# Patient Record
Sex: MALE | Race: WHITE | Employment: FULL TIME | URBAN - METROPOLITAN AREA
[De-identification: names, ages, dates, MRNs, and addresses within clinical notes are randomized per-mention and may not be internally consistent; named-entity substitution may affect disease eponyms.]

---

## 2024-10-03 ENCOUNTER — APPOINTMENT (OUTPATIENT)
Dept: GENERAL RADIOLOGY | Age: 44
DRG: 637 | End: 2024-10-03
Payer: COMMERCIAL

## 2024-10-03 ENCOUNTER — HOSPITAL ENCOUNTER (INPATIENT)
Age: 44
LOS: 5 days | Discharge: HOME | DRG: 637 | End: 2024-10-09
Attending: STUDENT IN AN ORGANIZED HEALTH CARE EDUCATION/TRAINING PROGRAM | Admitting: FAMILY MEDICINE
Payer: COMMERCIAL

## 2024-10-03 DIAGNOSIS — E08.10 DIABETIC KETOACIDOSIS WITHOUT COMA ASSOCIATED WITH DIABETES MELLITUS DUE TO UNDERLYING CONDITION (HCC): Primary | ICD-10-CM

## 2024-10-03 LAB
ALBUMIN SERPL-MCNC: 4.2 G/DL (ref 3.4–5)
ALBUMIN/GLOB SERPL: 1.4 {RATIO} (ref 1.1–2.2)
ALP SERPL-CCNC: 138 U/L (ref 40–129)
ALT SERPL-CCNC: 13 U/L (ref 10–40)
ANION GAP SERPL CALCULATED.3IONS-SCNC: 23 MMOL/L (ref 3–16)
AST SERPL-CCNC: 20 U/L (ref 15–37)
BASE EXCESS BLDV CALC-SCNC: -5.6 MMOL/L (ref -3–3)
BASOPHILS # BLD: 0 K/UL (ref 0–0.2)
BASOPHILS NFR BLD: 0.3 %
BETA-HYDROXYBUTYRATE: 4.2 MMOL/L (ref 0–0.27)
BILIRUB SERPL-MCNC: 1.2 MG/DL (ref 0–1)
BUN SERPL-MCNC: 34 MG/DL (ref 7–20)
CALCIUM SERPL-MCNC: 9.4 MG/DL (ref 8.3–10.6)
CHLORIDE SERPL-SCNC: 90 MMOL/L (ref 99–110)
CO2 BLDV-SCNC: 21 MMOL/L
CO2 SERPL-SCNC: 19 MMOL/L (ref 21–32)
COHGB MFR BLDV: 1.8 % (ref 0–1.5)
CREAT SERPL-MCNC: 2.2 MG/DL (ref 0.9–1.3)
DEPRECATED RDW RBC AUTO: 12.7 % (ref 12.4–15.4)
EOSINOPHIL # BLD: 0 K/UL (ref 0–0.6)
EOSINOPHIL NFR BLD: 0.2 %
ETHANOLAMINE SERPL-MCNC: NORMAL MG/DL (ref 0–0.08)
GFR SERPLBLD CREATININE-BSD FMLA CKD-EPI: 36 ML/MIN/{1.73_M2}
GLUCOSE BLD-MCNC: >600 MG/DL (ref 70–99)
GLUCOSE SERPL-MCNC: 916 MG/DL (ref 70–99)
HCO3 BLDV-SCNC: 19.5 MMOL/L (ref 23–29)
HCT VFR BLD AUTO: 43.6 % (ref 40.5–52.5)
HGB BLD-MCNC: 14.5 G/DL (ref 13.5–17.5)
INR PPP: 0.94 (ref 0.85–1.15)
LACTATE BLDV-SCNC: 3.3 MMOL/L (ref 0.4–2)
LIPASE SERPL-CCNC: 17 U/L (ref 13–60)
LYMPHOCYTES # BLD: 0.8 K/UL (ref 1–5.1)
LYMPHOCYTES NFR BLD: 4.9 %
MCH RBC QN AUTO: 30.9 PG (ref 26–34)
MCHC RBC AUTO-ENTMCNC: 33.3 G/DL (ref 31–36)
MCV RBC AUTO: 92.7 FL (ref 80–100)
METHGB MFR BLDV: 0 %
MONOCYTES # BLD: 1.2 K/UL (ref 0–1.3)
MONOCYTES NFR BLD: 7.6 %
NEUTROPHILS # BLD: 13.9 K/UL (ref 1.7–7.7)
NEUTROPHILS NFR BLD: 87 %
O2 THERAPY: ABNORMAL
PCO2 BLDV: 37.1 MMHG (ref 40–50)
PERFORMED ON: ABNORMAL
PH BLDV: 7.34 [PH] (ref 7.35–7.45)
PLATELET # BLD AUTO: 253 K/UL (ref 135–450)
PMV BLD AUTO: 10.2 FL (ref 5–10.5)
PO2 BLDV: 88 MMHG (ref 25–40)
POTASSIUM SERPL-SCNC: 4.6 MMOL/L (ref 3.5–5.1)
PROT SERPL-MCNC: 7.3 G/DL (ref 6.4–8.2)
PROTHROMBIN TIME: 12.8 SEC (ref 11.9–14.9)
RBC # BLD AUTO: 4.7 M/UL (ref 4.2–5.9)
SAO2 % BLDV: 96 %
SODIUM SERPL-SCNC: 132 MMOL/L (ref 136–145)
TROPONIN, HIGH SENSITIVITY: 24 NG/L (ref 0–22)
WBC # BLD AUTO: 16 K/UL (ref 4–11)

## 2024-10-03 PROCEDURE — 6360000002 HC RX W HCPCS

## 2024-10-03 PROCEDURE — 96375 TX/PRO/DX INJ NEW DRUG ADDON: CPT

## 2024-10-03 PROCEDURE — 96374 THER/PROPH/DIAG INJ IV PUSH: CPT

## 2024-10-03 PROCEDURE — 84484 ASSAY OF TROPONIN QUANT: CPT

## 2024-10-03 PROCEDURE — 82077 ASSAY SPEC XCP UR&BREATH IA: CPT

## 2024-10-03 PROCEDURE — 82010 KETONE BODYS QUAN: CPT

## 2024-10-03 PROCEDURE — 6360000002 HC RX W HCPCS: Performed by: STUDENT IN AN ORGANIZED HEALTH CARE EDUCATION/TRAINING PROGRAM

## 2024-10-03 PROCEDURE — 2580000003 HC RX 258: Performed by: STUDENT IN AN ORGANIZED HEALTH CARE EDUCATION/TRAINING PROGRAM

## 2024-10-03 PROCEDURE — 85025 COMPLETE CBC W/AUTO DIFF WBC: CPT

## 2024-10-03 PROCEDURE — 83690 ASSAY OF LIPASE: CPT

## 2024-10-03 PROCEDURE — 83605 ASSAY OF LACTIC ACID: CPT

## 2024-10-03 PROCEDURE — 82803 BLOOD GASES ANY COMBINATION: CPT

## 2024-10-03 PROCEDURE — 71045 X-RAY EXAM CHEST 1 VIEW: CPT

## 2024-10-03 PROCEDURE — 85610 PROTHROMBIN TIME: CPT

## 2024-10-03 PROCEDURE — 80053 COMPREHEN METABOLIC PANEL: CPT

## 2024-10-03 PROCEDURE — 83930 ASSAY OF BLOOD OSMOLALITY: CPT

## 2024-10-03 PROCEDURE — 83036 HEMOGLOBIN GLYCOSYLATED A1C: CPT

## 2024-10-03 PROCEDURE — 99285 EMERGENCY DEPT VISIT HI MDM: CPT

## 2024-10-03 PROCEDURE — 93005 ELECTROCARDIOGRAM TRACING: CPT | Performed by: STUDENT IN AN ORGANIZED HEALTH CARE EDUCATION/TRAINING PROGRAM

## 2024-10-03 RX ORDER — SODIUM CHLORIDE 450 MG/100ML
INJECTION, SOLUTION INTRAVENOUS CONTINUOUS
Status: DISCONTINUED | OUTPATIENT
Start: 2024-10-03 | End: 2024-10-04

## 2024-10-03 RX ORDER — PROCHLORPERAZINE EDISYLATE 5 MG/ML
10 INJECTION INTRAMUSCULAR; INTRAVENOUS ONCE
Status: COMPLETED | OUTPATIENT
Start: 2024-10-03 | End: 2024-10-03

## 2024-10-03 RX ORDER — POTASSIUM CHLORIDE 7.45 MG/ML
10 INJECTION INTRAVENOUS PRN
Status: DISCONTINUED | OUTPATIENT
Start: 2024-10-03 | End: 2024-10-04 | Stop reason: SDUPTHER

## 2024-10-03 RX ORDER — DROPERIDOL 2.5 MG/ML
0.62 INJECTION, SOLUTION INTRAMUSCULAR; INTRAVENOUS ONCE
Status: COMPLETED | OUTPATIENT
Start: 2024-10-03 | End: 2024-10-03

## 2024-10-03 RX ORDER — DIPHENHYDRAMINE HYDROCHLORIDE 50 MG/ML
10 INJECTION INTRAMUSCULAR; INTRAVENOUS ONCE
Status: COMPLETED | OUTPATIENT
Start: 2024-10-03 | End: 2024-10-03

## 2024-10-03 RX ORDER — ONDANSETRON 2 MG/ML
INJECTION INTRAMUSCULAR; INTRAVENOUS
Status: COMPLETED
Start: 2024-10-03 | End: 2024-10-03

## 2024-10-03 RX ORDER — DEXTROSE MONOHYDRATE, SODIUM CHLORIDE, AND POTASSIUM CHLORIDE 50; 1.49; 4.5 G/1000ML; G/1000ML; G/1000ML
INJECTION, SOLUTION INTRAVENOUS CONTINUOUS PRN
Status: DISCONTINUED | OUTPATIENT
Start: 2024-10-03 | End: 2024-10-04 | Stop reason: SDUPTHER

## 2024-10-03 RX ORDER — SODIUM CHLORIDE, SODIUM LACTATE, POTASSIUM CHLORIDE, AND CALCIUM CHLORIDE .6; .31; .03; .02 G/100ML; G/100ML; G/100ML; G/100ML
1000 INJECTION, SOLUTION INTRAVENOUS ONCE
Status: COMPLETED | OUTPATIENT
Start: 2024-10-03 | End: 2024-10-03

## 2024-10-03 RX ORDER — MAGNESIUM SULFATE IN WATER 40 MG/ML
2000 INJECTION, SOLUTION INTRAVENOUS PRN
Status: DISCONTINUED | OUTPATIENT
Start: 2024-10-03 | End: 2024-10-04 | Stop reason: SDUPTHER

## 2024-10-03 RX ADMIN — ONDANSETRON 4 MG: 2 INJECTION INTRAMUSCULAR; INTRAVENOUS at 22:19

## 2024-10-03 RX ADMIN — DIPHENHYDRAMINE HYDROCHLORIDE 10 MG: 50 INJECTION INTRAMUSCULAR; INTRAVENOUS at 22:35

## 2024-10-03 RX ADMIN — DROPERIDOL 0.62 MG: 2.5 INJECTION, SOLUTION INTRAMUSCULAR; INTRAVENOUS at 23:41

## 2024-10-03 RX ADMIN — SODIUM CHLORIDE, POTASSIUM CHLORIDE, SODIUM LACTATE AND CALCIUM CHLORIDE 1000 ML: 600; 310; 30; 20 INJECTION, SOLUTION INTRAVENOUS at 22:20

## 2024-10-03 RX ADMIN — PROCHLORPERAZINE EDISYLATE 10 MG: 5 INJECTION INTRAMUSCULAR; INTRAVENOUS at 22:35

## 2024-10-03 ASSESSMENT — LIFESTYLE VARIABLES
HOW MANY STANDARD DRINKS CONTAINING ALCOHOL DO YOU HAVE ON A TYPICAL DAY: 1 OR 2
HOW OFTEN DO YOU HAVE A DRINK CONTAINING ALCOHOL: 2-4 TIMES A MONTH

## 2024-10-03 ASSESSMENT — PAIN - FUNCTIONAL ASSESSMENT: PAIN_FUNCTIONAL_ASSESSMENT: NONE - DENIES PAIN

## 2024-10-04 ENCOUNTER — APPOINTMENT (OUTPATIENT)
Dept: GENERAL RADIOLOGY | Age: 44
DRG: 637 | End: 2024-10-04
Payer: COMMERCIAL

## 2024-10-04 PROBLEM — E10.10 DKA, TYPE 1, NOT AT GOAL (HCC): Status: ACTIVE | Noted: 2024-10-04

## 2024-10-04 PROBLEM — D72.823 LEUKEMOID REACTION: Status: ACTIVE | Noted: 2024-10-04

## 2024-10-04 PROBLEM — N18.32 STAGE 3B CHRONIC KIDNEY DISEASE (HCC): Status: ACTIVE | Noted: 2024-10-04

## 2024-10-04 PROBLEM — E08.10 DIABETIC KETOACIDOSIS WITHOUT COMA ASSOCIATED WITH DIABETES MELLITUS DUE TO UNDERLYING CONDITION (HCC): Status: ACTIVE | Noted: 2024-10-04

## 2024-10-04 LAB
ANION GAP SERPL CALCULATED.3IONS-SCNC: 11 MMOL/L (ref 3–16)
ANION GAP SERPL CALCULATED.3IONS-SCNC: 18 MMOL/L (ref 3–16)
ANION GAP SERPL CALCULATED.3IONS-SCNC: 22 MMOL/L (ref 3–16)
ANION GAP SERPL CALCULATED.3IONS-SCNC: 23 MMOL/L (ref 3–16)
ANION GAP SERPL CALCULATED.3IONS-SCNC: 9 MMOL/L (ref 3–16)
BUN SERPL-MCNC: 36 MG/DL (ref 7–20)
BUN SERPL-MCNC: 41 MG/DL (ref 7–20)
BUN SERPL-MCNC: 43 MG/DL (ref 7–20)
CALCIUM SERPL-MCNC: 8.4 MG/DL (ref 8.3–10.6)
CALCIUM SERPL-MCNC: 8.4 MG/DL (ref 8.3–10.6)
CALCIUM SERPL-MCNC: 8.7 MG/DL (ref 8.3–10.6)
CALCIUM SERPL-MCNC: 9.1 MG/DL (ref 8.3–10.6)
CALCIUM SERPL-MCNC: 9.2 MG/DL (ref 8.3–10.6)
CHLORIDE SERPL-SCNC: 106 MMOL/L (ref 99–110)
CHLORIDE SERPL-SCNC: 108 MMOL/L (ref 99–110)
CHLORIDE SERPL-SCNC: 90 MMOL/L (ref 99–110)
CHLORIDE SERPL-SCNC: 92 MMOL/L (ref 99–110)
CHLORIDE SERPL-SCNC: 99 MMOL/L (ref 99–110)
CO2 SERPL-SCNC: 19 MMOL/L (ref 21–32)
CO2 SERPL-SCNC: 20 MMOL/L (ref 21–32)
CO2 SERPL-SCNC: 20 MMOL/L (ref 21–32)
CO2 SERPL-SCNC: 23 MMOL/L (ref 21–32)
CO2 SERPL-SCNC: 25 MMOL/L (ref 21–32)
CREAT SERPL-MCNC: 2.2 MG/DL (ref 0.9–1.3)
CREAT SERPL-MCNC: 2.3 MG/DL (ref 0.9–1.3)
CREAT SERPL-MCNC: 2.7 MG/DL (ref 0.9–1.3)
CREAT SERPL-MCNC: 2.8 MG/DL (ref 0.9–1.3)
CREAT SERPL-MCNC: 2.8 MG/DL (ref 0.9–1.3)
EKG ATRIAL RATE: 103 BPM
EKG DIAGNOSIS: NORMAL
EKG P AXIS: 62 DEGREES
EKG P-R INTERVAL: 136 MS
EKG Q-T INTERVAL: 364 MS
EKG QRS DURATION: 106 MS
EKG QTC CALCULATION (BAZETT): 476 MS
EKG R AXIS: 79 DEGREES
EKG T AXIS: 35 DEGREES
EKG VENTRICULAR RATE: 103 BPM
EST. AVERAGE GLUCOSE BLD GHB EST-MCNC: 174.3 MG/DL
GFR SERPLBLD CREATININE-BSD FMLA CKD-EPI: 27 ML/MIN/{1.73_M2}
GFR SERPLBLD CREATININE-BSD FMLA CKD-EPI: 27 ML/MIN/{1.73_M2}
GFR SERPLBLD CREATININE-BSD FMLA CKD-EPI: 28 ML/MIN/{1.73_M2}
GFR SERPLBLD CREATININE-BSD FMLA CKD-EPI: 35 ML/MIN/{1.73_M2}
GFR SERPLBLD CREATININE-BSD FMLA CKD-EPI: 36 ML/MIN/{1.73_M2}
GLUCOSE BLD-MCNC: 105 MG/DL (ref 70–99)
GLUCOSE BLD-MCNC: 134 MG/DL (ref 70–99)
GLUCOSE BLD-MCNC: 146 MG/DL (ref 70–99)
GLUCOSE BLD-MCNC: 159 MG/DL (ref 70–99)
GLUCOSE BLD-MCNC: 193 MG/DL (ref 70–99)
GLUCOSE BLD-MCNC: 221 MG/DL (ref 70–99)
GLUCOSE BLD-MCNC: 265 MG/DL (ref 70–99)
GLUCOSE BLD-MCNC: 295 MG/DL (ref 70–99)
GLUCOSE BLD-MCNC: 358 MG/DL (ref 70–99)
GLUCOSE BLD-MCNC: 381 MG/DL (ref 70–99)
GLUCOSE BLD-MCNC: 470 MG/DL (ref 70–99)
GLUCOSE BLD-MCNC: 523 MG/DL (ref 70–99)
GLUCOSE BLD-MCNC: 587 MG/DL (ref 70–99)
GLUCOSE BLD-MCNC: 85 MG/DL (ref 70–99)
GLUCOSE BLD-MCNC: >600 MG/DL (ref 70–99)
GLUCOSE BLD-MCNC: >600 MG/DL (ref 70–99)
GLUCOSE SERPL-MCNC: 214 MG/DL (ref 70–99)
GLUCOSE SERPL-MCNC: 390 MG/DL (ref 70–99)
GLUCOSE SERPL-MCNC: 605 MG/DL (ref 70–99)
GLUCOSE SERPL-MCNC: 886 MG/DL (ref 70–99)
GLUCOSE SERPL-MCNC: 906 MG/DL (ref 70–99)
HBA1C MFR BLD: 7.7 %
LACTATE BLDV-SCNC: 3.4 MMOL/L (ref 0.4–2)
MAGNESIUM SERPL-MCNC: 1.9 MG/DL (ref 1.8–2.4)
MAGNESIUM SERPL-MCNC: 2 MG/DL (ref 1.8–2.4)
MAGNESIUM SERPL-MCNC: 2.1 MG/DL (ref 1.8–2.4)
OSMOLALITY SERPL: 364 MOSM/KG (ref 275–295)
PERFORMED ON: ABNORMAL
PERFORMED ON: NORMAL
PHOSPHATE SERPL-MCNC: 0.7 MG/DL (ref 2.5–4.9)
PHOSPHATE SERPL-MCNC: 1.2 MG/DL (ref 2.5–4.9)
PHOSPHATE SERPL-MCNC: 1.4 MG/DL (ref 2.5–4.9)
POTASSIUM SERPL-SCNC: 3.1 MMOL/L (ref 3.5–5.1)
POTASSIUM SERPL-SCNC: 3.3 MMOL/L (ref 3.5–5.1)
POTASSIUM SERPL-SCNC: 3.4 MMOL/L (ref 3.5–5.1)
POTASSIUM SERPL-SCNC: 3.8 MMOL/L (ref 3.5–5.1)
POTASSIUM SERPL-SCNC: 4.7 MMOL/L (ref 3.5–5.1)
SODIUM SERPL-SCNC: 133 MMOL/L (ref 136–145)
SODIUM SERPL-SCNC: 133 MMOL/L (ref 136–145)
SODIUM SERPL-SCNC: 137 MMOL/L (ref 136–145)
SODIUM SERPL-SCNC: 140 MMOL/L (ref 136–145)
SODIUM SERPL-SCNC: 142 MMOL/L (ref 136–145)
TROPONIN, HIGH SENSITIVITY: 29 NG/L (ref 0–22)

## 2024-10-04 PROCEDURE — 6370000000 HC RX 637 (ALT 250 FOR IP): Performed by: INTERNAL MEDICINE

## 2024-10-04 PROCEDURE — 2500000003 HC RX 250 WO HCPCS: Performed by: FAMILY MEDICINE

## 2024-10-04 PROCEDURE — 2580000003 HC RX 258: Performed by: INTERNAL MEDICINE

## 2024-10-04 PROCEDURE — 74018 RADEX ABDOMEN 1 VIEW: CPT

## 2024-10-04 PROCEDURE — 2580000003 HC RX 258: Performed by: FAMILY MEDICINE

## 2024-10-04 PROCEDURE — 6360000002 HC RX W HCPCS: Performed by: FAMILY MEDICINE

## 2024-10-04 PROCEDURE — 83036 HEMOGLOBIN GLYCOSYLATED A1C: CPT

## 2024-10-04 PROCEDURE — 6370000000 HC RX 637 (ALT 250 FOR IP): Performed by: FAMILY MEDICINE

## 2024-10-04 PROCEDURE — 99223 1ST HOSP IP/OBS HIGH 75: CPT | Performed by: INTERNAL MEDICINE

## 2024-10-04 PROCEDURE — 94761 N-INVAS EAR/PLS OXIMETRY MLT: CPT

## 2024-10-04 PROCEDURE — 6370000000 HC RX 637 (ALT 250 FOR IP): Performed by: STUDENT IN AN ORGANIZED HEALTH CARE EDUCATION/TRAINING PROGRAM

## 2024-10-04 PROCEDURE — 2000000000 HC ICU R&B

## 2024-10-04 PROCEDURE — 83605 ASSAY OF LACTIC ACID: CPT

## 2024-10-04 PROCEDURE — 2700000000 HC OXYGEN THERAPY PER DAY

## 2024-10-04 PROCEDURE — 93010 ELECTROCARDIOGRAM REPORT: CPT | Performed by: INTERNAL MEDICINE

## 2024-10-04 PROCEDURE — 99232 SBSQ HOSP IP/OBS MODERATE 35: CPT | Performed by: INTERNAL MEDICINE

## 2024-10-04 PROCEDURE — 84100 ASSAY OF PHOSPHORUS: CPT

## 2024-10-04 PROCEDURE — 80048 BASIC METABOLIC PNL TOTAL CA: CPT

## 2024-10-04 PROCEDURE — 36415 COLL VENOUS BLD VENIPUNCTURE: CPT

## 2024-10-04 PROCEDURE — 83735 ASSAY OF MAGNESIUM: CPT

## 2024-10-04 PROCEDURE — 6360000002 HC RX W HCPCS: Performed by: INTERNAL MEDICINE

## 2024-10-04 PROCEDURE — 2580000003 HC RX 258: Performed by: STUDENT IN AN ORGANIZED HEALTH CARE EDUCATION/TRAINING PROGRAM

## 2024-10-04 RX ORDER — INSULIN GLARGINE 100 [IU]/ML
15 INJECTION, SOLUTION SUBCUTANEOUS ONCE
Status: DISCONTINUED | OUTPATIENT
Start: 2024-10-04 | End: 2024-10-04

## 2024-10-04 RX ORDER — ONDANSETRON 4 MG/1
4 TABLET, ORALLY DISINTEGRATING ORAL EVERY 8 HOURS PRN
Status: DISCONTINUED | OUTPATIENT
Start: 2024-10-04 | End: 2024-10-09 | Stop reason: HOSPADM

## 2024-10-04 RX ORDER — SODIUM CHLORIDE, SODIUM LACTATE, POTASSIUM CHLORIDE, AND CALCIUM CHLORIDE .6; .31; .03; .02 G/100ML; G/100ML; G/100ML; G/100ML
1000 INJECTION, SOLUTION INTRAVENOUS ONCE
Status: COMPLETED | OUTPATIENT
Start: 2024-10-04 | End: 2024-10-04

## 2024-10-04 RX ORDER — ROSUVASTATIN CALCIUM 20 MG/1
20 TABLET, COATED ORAL DAILY
COMMUNITY

## 2024-10-04 RX ORDER — ERGOCALCIFEROL 1.25 MG/1
50000 CAPSULE, LIQUID FILLED ORAL WEEKLY
COMMUNITY

## 2024-10-04 RX ORDER — 0.9 % SODIUM CHLORIDE 0.9 %
1000 INTRAVENOUS SOLUTION INTRAVENOUS ONCE
Status: COMPLETED | OUTPATIENT
Start: 2024-10-04 | End: 2024-10-04

## 2024-10-04 RX ORDER — ENOXAPARIN SODIUM 100 MG/ML
40 INJECTION SUBCUTANEOUS DAILY
Status: DISCONTINUED | OUTPATIENT
Start: 2024-10-04 | End: 2024-10-09 | Stop reason: HOSPADM

## 2024-10-04 RX ORDER — DEXTROSE MONOHYDRATE 100 MG/ML
INJECTION, SOLUTION INTRAVENOUS CONTINUOUS PRN
Status: DISCONTINUED | OUTPATIENT
Start: 2024-10-04 | End: 2024-10-07

## 2024-10-04 RX ORDER — MUPIROCIN 20 MG/G
OINTMENT TOPICAL 2 TIMES DAILY
Status: DISCONTINUED | OUTPATIENT
Start: 2024-10-04 | End: 2024-10-08

## 2024-10-04 RX ORDER — DEXTROSE MONOHYDRATE, SODIUM CHLORIDE, AND POTASSIUM CHLORIDE 50; 1.49; 4.5 G/1000ML; G/1000ML; G/1000ML
INJECTION, SOLUTION INTRAVENOUS CONTINUOUS PRN
Status: DISCONTINUED | OUTPATIENT
Start: 2024-10-04 | End: 2024-10-07

## 2024-10-04 RX ORDER — MAGNESIUM SULFATE IN WATER 40 MG/ML
2000 INJECTION, SOLUTION INTRAVENOUS PRN
Status: DISCONTINUED | OUTPATIENT
Start: 2024-10-04 | End: 2024-10-04 | Stop reason: SDUPTHER

## 2024-10-04 RX ORDER — DEXTROSE MONOHYDRATE 100 MG/ML
INJECTION, SOLUTION INTRAVENOUS CONTINUOUS PRN
Status: DISCONTINUED | OUTPATIENT
Start: 2024-10-04 | End: 2024-10-09 | Stop reason: HOSPADM

## 2024-10-04 RX ORDER — LOSARTAN POTASSIUM 25 MG/1
25 TABLET ORAL DAILY
COMMUNITY

## 2024-10-04 RX ORDER — MAGNESIUM SULFATE IN WATER 40 MG/ML
2000 INJECTION, SOLUTION INTRAVENOUS PRN
Status: DISCONTINUED | OUTPATIENT
Start: 2024-10-04 | End: 2024-10-07

## 2024-10-04 RX ORDER — POTASSIUM CHLORIDE 29.8 MG/ML
20 INJECTION INTRAVENOUS PRN
Status: DISCONTINUED | OUTPATIENT
Start: 2024-10-04 | End: 2024-10-04 | Stop reason: SDUPTHER

## 2024-10-04 RX ORDER — POLYETHYLENE GLYCOL 3350 17 G/17G
17 POWDER, FOR SOLUTION ORAL DAILY PRN
Status: DISCONTINUED | OUTPATIENT
Start: 2024-10-04 | End: 2024-10-09 | Stop reason: HOSPADM

## 2024-10-04 RX ORDER — SODIUM CHLORIDE 0.9 % (FLUSH) 0.9 %
5-40 SYRINGE (ML) INJECTION PRN
Status: DISCONTINUED | OUTPATIENT
Start: 2024-10-04 | End: 2024-10-09 | Stop reason: HOSPADM

## 2024-10-04 RX ORDER — INSULIN LISPRO 100 [IU]/ML
0-4 INJECTION, SOLUTION INTRAVENOUS; SUBCUTANEOUS
Status: DISCONTINUED | OUTPATIENT
Start: 2024-10-04 | End: 2024-10-04

## 2024-10-04 RX ORDER — MORPHINE SULFATE 2 MG/ML
2 INJECTION, SOLUTION INTRAMUSCULAR; INTRAVENOUS EVERY 4 HOURS PRN
Status: DISCONTINUED | OUTPATIENT
Start: 2024-10-04 | End: 2024-10-09 | Stop reason: HOSPADM

## 2024-10-04 RX ORDER — ENOXAPARIN SODIUM 100 MG/ML
40 INJECTION SUBCUTANEOUS DAILY
Status: DISCONTINUED | OUTPATIENT
Start: 2024-10-04 | End: 2024-10-04 | Stop reason: SDUPTHER

## 2024-10-04 RX ORDER — INSULIN LISPRO 100 [IU]/ML
0-4 INJECTION, SOLUTION INTRAVENOUS; SUBCUTANEOUS NIGHTLY
Status: DISCONTINUED | OUTPATIENT
Start: 2024-10-04 | End: 2024-10-04

## 2024-10-04 RX ORDER — AMLODIPINE BESYLATE 10 MG/1
10 TABLET ORAL DAILY
COMMUNITY

## 2024-10-04 RX ORDER — SODIUM CHLORIDE 0.9 % (FLUSH) 0.9 %
5-40 SYRINGE (ML) INJECTION EVERY 12 HOURS SCHEDULED
Status: DISCONTINUED | OUTPATIENT
Start: 2024-10-04 | End: 2024-10-09 | Stop reason: HOSPADM

## 2024-10-04 RX ORDER — GLUCAGON 1 MG/ML
1 KIT INJECTION PRN
Status: DISCONTINUED | OUTPATIENT
Start: 2024-10-04 | End: 2024-10-09 | Stop reason: HOSPADM

## 2024-10-04 RX ORDER — ACETAMINOPHEN 325 MG/1
650 TABLET ORAL EVERY 6 HOURS PRN
Status: DISCONTINUED | OUTPATIENT
Start: 2024-10-04 | End: 2024-10-09 | Stop reason: HOSPADM

## 2024-10-04 RX ORDER — SODIUM CHLORIDE 9 MG/ML
INJECTION, SOLUTION INTRAVENOUS CONTINUOUS
Status: DISCONTINUED | OUTPATIENT
Start: 2024-10-04 | End: 2024-10-07

## 2024-10-04 RX ORDER — POTASSIUM CHLORIDE 7.45 MG/ML
10 INJECTION INTRAVENOUS PRN
Status: DISCONTINUED | OUTPATIENT
Start: 2024-10-04 | End: 2024-10-09 | Stop reason: HOSPADM

## 2024-10-04 RX ORDER — METOCLOPRAMIDE HYDROCHLORIDE 5 MG/ML
10 INJECTION INTRAMUSCULAR; INTRAVENOUS ONCE
Status: COMPLETED | OUTPATIENT
Start: 2024-10-04 | End: 2024-10-04

## 2024-10-04 RX ORDER — POTASSIUM CHLORIDE 7.45 MG/ML
10 INJECTION INTRAVENOUS PRN
Status: DISCONTINUED | OUTPATIENT
Start: 2024-10-04 | End: 2024-10-04 | Stop reason: SDUPTHER

## 2024-10-04 RX ORDER — SODIUM CHLORIDE 9 MG/ML
INJECTION, SOLUTION INTRAVENOUS PRN
Status: DISCONTINUED | OUTPATIENT
Start: 2024-10-04 | End: 2024-10-09 | Stop reason: HOSPADM

## 2024-10-04 RX ORDER — ONDANSETRON 2 MG/ML
4 INJECTION INTRAMUSCULAR; INTRAVENOUS EVERY 6 HOURS PRN
Status: DISCONTINUED | OUTPATIENT
Start: 2024-10-04 | End: 2024-10-09 | Stop reason: HOSPADM

## 2024-10-04 RX ORDER — ACETAMINOPHEN 650 MG/1
650 SUPPOSITORY RECTAL EVERY 6 HOURS PRN
Status: DISCONTINUED | OUTPATIENT
Start: 2024-10-04 | End: 2024-10-09 | Stop reason: HOSPADM

## 2024-10-04 RX ADMIN — SODIUM CHLORIDE, PRESERVATIVE FREE 10 ML: 5 INJECTION INTRAVENOUS at 08:22

## 2024-10-04 RX ADMIN — SODIUM CHLORIDE 16.1 UNITS/HR: 9 INJECTION, SOLUTION INTRAVENOUS at 06:06

## 2024-10-04 RX ADMIN — SODIUM CHLORIDE 8.9 UNITS/HR: 9 INJECTION, SOLUTION INTRAVENOUS at 12:18

## 2024-10-04 RX ADMIN — SODIUM CHLORIDE, PRESERVATIVE FREE 10 ML: 5 INJECTION INTRAVENOUS at 19:32

## 2024-10-04 RX ADMIN — POTASSIUM CHLORIDE 10 MEQ: 7.46 INJECTION, SOLUTION INTRAVENOUS at 08:03

## 2024-10-04 RX ADMIN — POTASSIUM CHLORIDE, DEXTROSE MONOHYDRATE AND SODIUM CHLORIDE: 150; 5; 450 INJECTION, SOLUTION INTRAVENOUS at 10:20

## 2024-10-04 RX ADMIN — ENOXAPARIN SODIUM 40 MG: 100 INJECTION SUBCUTANEOUS at 08:04

## 2024-10-04 RX ADMIN — ONDANSETRON 4 MG: 2 INJECTION INTRAMUSCULAR; INTRAVENOUS at 19:32

## 2024-10-04 RX ADMIN — ONDANSETRON 4 MG: 2 INJECTION INTRAMUSCULAR; INTRAVENOUS at 12:42

## 2024-10-04 RX ADMIN — INSULIN HUMAN 20 UNITS: 100 INJECTION, SOLUTION PARENTERAL at 01:08

## 2024-10-04 RX ADMIN — SODIUM CHLORIDE: 9 INJECTION, SOLUTION INTRAVENOUS at 15:24

## 2024-10-04 RX ADMIN — METOCLOPRAMIDE HYDROCHLORIDE 10 MG: 5 INJECTION INTRAMUSCULAR; INTRAVENOUS at 08:22

## 2024-10-04 RX ADMIN — SODIUM CHLORIDE: 4.5 INJECTION, SOLUTION INTRAVENOUS at 00:10

## 2024-10-04 RX ADMIN — MUPIROCIN: 20 OINTMENT TOPICAL at 19:33

## 2024-10-04 RX ADMIN — MORPHINE SULFATE 2 MG: 2 INJECTION, SOLUTION INTRAMUSCULAR; INTRAVENOUS at 19:31

## 2024-10-04 RX ADMIN — SODIUM CHLORIDE: 9 INJECTION, SOLUTION INTRAVENOUS at 22:47

## 2024-10-04 RX ADMIN — MORPHINE SULFATE 2 MG: 2 INJECTION, SOLUTION INTRAMUSCULAR; INTRAVENOUS at 15:32

## 2024-10-04 RX ADMIN — SODIUM CHLORIDE 5 ML/HR: 9 INJECTION, SOLUTION INTRAVENOUS at 06:47

## 2024-10-04 RX ADMIN — SODIUM CHLORIDE, POTASSIUM CHLORIDE, SODIUM LACTATE AND CALCIUM CHLORIDE 1000 ML: 600; 310; 30; 20 INJECTION, SOLUTION INTRAVENOUS at 13:23

## 2024-10-04 RX ADMIN — SODIUM PHOSPHATE, MONOBASIC, MONOHYDRATE AND SODIUM PHOSPHATE, DIBASIC, ANHYDROUS 15 MMOL: 142; 276 INJECTION, SOLUTION INTRAVENOUS at 10:24

## 2024-10-04 RX ADMIN — ONDANSETRON 4 MG: 2 INJECTION INTRAMUSCULAR; INTRAVENOUS at 06:08

## 2024-10-04 RX ADMIN — POTASSIUM CHLORIDE 10 MEQ: 7.46 INJECTION, SOLUTION INTRAVENOUS at 06:49

## 2024-10-04 RX ADMIN — MUPIROCIN: 20 OINTMENT TOPICAL at 08:04

## 2024-10-04 RX ADMIN — SODIUM CHLORIDE 10.8 UNITS/HR: 9 INJECTION, SOLUTION INTRAVENOUS at 00:25

## 2024-10-04 RX ADMIN — SODIUM CHLORIDE: 9 INJECTION, SOLUTION INTRAVENOUS at 08:45

## 2024-10-04 RX ADMIN — SODIUM CHLORIDE: 9 INJECTION, SOLUTION INTRAVENOUS at 03:51

## 2024-10-04 RX ADMIN — SODIUM CHLORIDE 1000 ML: 9 INJECTION, SOLUTION INTRAVENOUS at 01:37

## 2024-10-04 ASSESSMENT — PAIN SCALES - GENERAL
PAINLEVEL_OUTOF10: 0
PAINLEVEL_OUTOF10: 6
PAINLEVEL_OUTOF10: 6

## 2024-10-04 ASSESSMENT — LIFESTYLE VARIABLES: HOW OFTEN DO YOU HAVE A DRINK CONTAINING ALCOHOL: NEVER

## 2024-10-04 ASSESSMENT — PAIN DESCRIPTION - LOCATION
LOCATION: ABDOMEN
LOCATION: ABDOMEN;CHEST

## 2024-10-04 ASSESSMENT — PAIN - FUNCTIONAL ASSESSMENT: PAIN_FUNCTIONAL_ASSESSMENT: ACTIVITIES ARE NOT PREVENTED

## 2024-10-04 ASSESSMENT — PAIN DESCRIPTION - ORIENTATION: ORIENTATION: MID;INNER

## 2024-10-04 ASSESSMENT — PAIN DESCRIPTION - DESCRIPTORS: DESCRIPTORS: ACHING;DISCOMFORT

## 2024-10-04 NOTE — PROGRESS NOTES
AM assessment complete, see flowsheet. Medications given per MAR. Pt A&O x4, resting in bed eyes closed. Frequent N/V. Dr STANFORD at the bedside and one time order for Reglan IV given.     VSS. Insulin gtt infusing. BG still above 250. No other needs noted. Will continue to monitor.

## 2024-10-04 NOTE — PROGRESS NOTES
Patient to unit, assessment and admission complete, patient nauseated, small emesis.  A/O, wife at bedside assists with questions, not aware of medication names, states gets them from [employer], states he is on an insulin pump, a BP medication, and a cholesterol medication.  Pharmacy assists with medication list.  Patient oriented to room, call light given and explained.  Orders and expectations discussed.  Blood sugar improving but greater than 500 still, lactic 3.4, discussed with nocturnist at bedside.  Patient had had recent diarrhea yesterday several times, placed in c-diff precautions to rule out.  Insulin drip infusing, second bolus infused, no further lactic ordered.  Right great toe edema noted, stubbed few days ago, not painful, not red, wife states is improving, nocturnist assessed foot.  Patient had complication with insulin pump that resulted in high readings, Nausea and vomiting yesterday with high blood sugar readings, unable to get readings down.    Oxygen on 2 liters NC, lung sounds diminished but clear, bowel sounds active.  Patient aware stool and urine samples needed.  IV x 2 intact.  Will continue to monitor.

## 2024-10-04 NOTE — PROGRESS NOTES
GAP closed x2, pt remains nausea and unable to tolerate diet. Insulin gtt turned off and switch to NS @ 100 ml/hr.    Restart home insulin pump. Wife at the bedside. Pump settings obtained. Consent for home pump used signed. BG q4, will continue to monitor.     Pump settings;    Basal Rate:    7968-3150 1.2  0166-7877 1.4  0717-2916 1.2    Humalog     Carb Ratio:    6104-5641 7  3091-0296 6  3846-9962 10

## 2024-10-04 NOTE — PROGRESS NOTES
GAP closed x1. Most recent . Fluids switched per protocol. Insulin gtt remains on. Pt resting quietly. Will continue to monitor.

## 2024-10-04 NOTE — PROGRESS NOTES
4 Eyes Skin Assessment     NAME:  Russell Doe  YOB: 1980  MEDICAL RECORD NUMBER:  [id number]    The patient is being assessed for  Admission    I agree that at least one RN has performed a thorough Head to Toe Skin Assessment on the patient. ALL assessment sites listed below have been assessed.      Areas assessed by both nurses:    Head, Face, Ears, Shoulders, Back, Chest, Arms, Elbows, Hands, Sacrum. Buttock, Coccyx, Ischium, Legs. Feet and Heels, and Under Medical Devices         Does the Patient have a Wound? No noted wound(s)       Edema right great toe.    Seth Prevention initiated by RN: No  Wound Care Orders initiated by RN: No    Pressure Injury (Stage 3,4, Unstageable, DTI, NWPT, and Complex wounds) if present, place Wound referral order by RN under : No    New Ostomies, if present place, Ostomy referral order under : No     Nurse 1 eSignature: Electronically signed by Jeffrey Garcia RN on 10/4/24 at 5:06 AM EDT    **SHARE this note so that the co-signing nurse can place an eSignature**    Nurse 2 eSignature: Electronically signed by Fly Shirley RN on 10/4/24 at 6:34 AM EDT    Patient is able to demonstrate the ability to move from a reclining position to an upright position within the recliner.

## 2024-10-04 NOTE — PROGRESS NOTES
IM Progress Note    Admit Date:  10/3/2024  0    Interval history:  DKA , hx of TYpe 1 DM with CKD     Subjective:  Mr. Doe seen up in bed, feels sick and tired, still with dry heaving  No diarrhea since arrival   On insulin pump and f/w Endocrine at [facility]    Objective:   /65   Pulse 93   Temp 97.7 °F (36.5 °C) (Temporal)   Resp 18   Ht 1.676 m (5' 6\")   Wt 77.7 kg (171 lb 4.8 oz)   SpO2 98%   BMI 27.65 kg/m²     Intake/Output Summary (Last 24 hours) at 10/4/2024 1346  Last data filed at 10/4/2024 0846  Gross per 24 hour   Intake 937.55 ml   Output --   Net 937.55 ml       Physical Exam:      General:  middle aged male ill appearing  Awake, alert and oriented. Appears to be not in any distress  Mucous Membranes:  Pink , anicteric  Neck: No JVD, no carotid bruit, no thyromegaly  Chest:  Clear to auscultation bilaterally, no added sounds  Cardiovascular:  RRR S1S2 heard, no murmurs or gallops  Abdomen:  Soft, undistended, mid epigastric tenderness  , no organomegaly, BS present  Extremities: No edema or cyanosis. Distal pulses well felt  Neurological : grossly normal non focal     Medications:   Scheduled Medications:    sodium chloride flush  5-40 mL IntraVENous 2 times per day    enoxaparin  40 mg SubCUTAneous Daily    mupirocin   Each Nostril BID    lactated ringers  1,000 mL IntraVENous Once    insulin glargine  15 Units SubCUTAneous Once    insulin lispro  0-4 Units SubCUTAneous TID WC    insulin lispro  0-4 Units SubCUTAneous Nightly     I   sodium chloride 5 mL/hr (10/04/24 0647)    dextrose 5% and 0.45% NaCl with KCl 20 mEq 150 mL/hr at 10/04/24 1020    sodium chloride Stopped (10/04/24 1019)    dextrose       sodium chloride flush, sodium chloride, ondansetron **OR** ondansetron, polyethylene glycol, acetaminophen **OR** acetaminophen, dextrose bolus **OR** dextrose bolus, potassium chloride, magnesium sulfate, sodium phosphate 15 mmol in sodium chloride 0.9 % 250 mL IVPB, dextrose 5% and 0.45%

## 2024-10-04 NOTE — ED NOTES
Pt glucose still high, writer informed provider that pts glucose was still 886, does he want the 20U of insulin still given as 20U because of small drop since insulin drip started, provider okay with the 20U given.

## 2024-10-04 NOTE — PROGRESS NOTES
Blood sugar slowly improving, now 381.  Nauseated, zofran given, lab results back, will start potassium replacement, phosphorus ordered from pharmacy.

## 2024-10-04 NOTE — ED NOTES
No regular insulin in fridge, writer called pharmacy to give pt bolus dose, awaiting medication from pharmacy.

## 2024-10-04 NOTE — CONSULTS
[facility]                                 Pulmonary/Critical care  Consult /Progress Note :                                                                  CC :DKA  Patient is being seen at the request of Dr STANFORD  for a consultation for DKA     HISTORY OF PRESENT ILLNESS:   44 y.o. male with history of type 1 diabetes, who presented to the ER with complaints of nausea vomiting with his glucometer reading significantly elevated glucose levels.      patients had changed his insulin pump yesterday and possible it was malfunction      Workup in the ER revealed patient was in DKA.   Received 2 L ,has Reglan       PAST MEDICAL HISTORY:  Past Medical History:   Diagnosis Date    Diabetes mellitus (HCC)     Gastroparesis     Hypertension     Kidney disease      PAST SURGICAL HISTORY:  Past Surgical History:   Procedure Laterality Date    EYE SURGERY Bilateral 2004    HIP SURGERY Right 2020    hip labrum    PENIS SURGERY  2021    per wife, implant    RETINAL DETACHMENT SURGERY Left 2017       FAMILY HISTORY:  family history includes Diabetes in his father and mother; Heart Attack in his maternal grandmother; High Blood Pressure in his mother; Other in his father.    SOCIAL HISTORY:   reports that he has never smoked. He has never used smokeless tobacco.    Scheduled Meds:   sodium chloride flush  5-40 mL IntraVENous 2 times per day    enoxaparin  40 mg SubCUTAneous Daily    mupirocin   Each Nostril BID     Continuous Infusions:   sodium chloride 5 mL/hr (10/04/24 0647)    dextrose 5% and 0.45% NaCl with KCl 20 mEq      sodium chloride 250 mL/hr at 10/04/24 0845    sodium chloride Stopped (10/04/24 0349)    insulin 16.2 Units/hr (10/04/24 0825)     PRN Meds:  sodium chloride flush, sodium chloride, ondansetron **OR** ondansetron, polyethylene glycol, acetaminophen **OR** acetaminophen, dextrose bolus **OR** dextrose bolus, potassium  chloride, magnesium sulfate, sodium phosphate 15 mmol in sodium chloride 0.9 % 250 mL IVPB, dextrose 5% and 0.45% NaCl with KCl 20 mEq    ALLERGIES:  Patient has No Known Allergies.    REVIEW OF SYSTEMS:  Constitutional: Negative for fever  HENT: Negative for sore throat  Eyes: Negative for redness   Respiratory: Negative for dyspnea, cough  Cardiovascular: Negative for chest pain  Gastrointestinal: Negative for vomiting, diarrhea   Genitourinary: Negative for hematuria   Musculoskeletal: Negative for arthralgias   Skin: Negative for rash  Neurological: Negative for syncope  Hematological: Negative for adenopathy  Psychiatric/Behavorial: Negative for anxiety    PHYSICAL EXAM:  Vitals:    10/04/24 0800   BP: 123/61   Pulse: 96   Resp:    Temp:    SpO2: 98%     Gen: No distress.   Eyes: PERRL. No sclera icterus. No conjunctival injection.   ENT: No discharge. Pharynx clear.   Neck: Trachea midline. No obvious mass.    Resp: No accessory muscle use. No crackles. No wheezes. No rhonchi. No dullness on percussion.  CV: Regular rate. Regular rhythm. No murmur or rub. No edema. Peripheral pulses are 2+.  Capillary refill is less than 3 seconds.  GI: Non-tender. Non-distended. No hernia.   Skin: Warm and dry. No nodule on exposed extremities.   Lymph: No cervical LAD. No supraclavicular LAD.   M/S: No cyanosis. No joint deformity. No clubbing.   Neuro: Awake. Alert. Moves all four extremities.   Psych: Oriented x 3. No anxiety.     LABS:  CBC:   Recent Labs     10/03/24  2213   WBC 16.0*   HGB 14.5   HCT 43.6   MCV 92.7        BMP:   Recent Labs     10/04/24  0118 10/04/24  0357 10/04/24  0735   * 137 140   K 3.8 3.1* 3.3*   CL 92* 99 106   CO2 19* 20* 23   PHOS  --  1.4* 0.7*   BUN 41* 41* 41*   CREATININE 2.3* 2.7* 2.8*     LIVER PROFILE:   Recent Labs     10/03/24  2213   AST 20   ALT 13   LIPASE 17.0   BILITOT 1.2*   ALKPHOS 138*     PT/INR:   Recent Labs     10/03/24  2213   PROTIME 12.8   INR 0.94

## 2024-10-04 NOTE — ED PROVIDER NOTES
[facility] ED      CHIEF COMPLAINT  Hyperglycemia (Fingerstick in squad and room reading \"high\", 400 ml fluids giver per squad)       HISTORY OF PRESENT ILLNESS  Russell Doe is a 44 y.o. male  who presents to the ED complaining of nausea, vomiting, elevated glucose.  Patient states symptoms started today.  He is wearing a GCM and an insulin pump.  Denies any fevers, chills.  Has had multiple episodes of brown emesis.  Denies any chest pain or shortness of breath.    No other complaints, modifying factors or associated symptoms.     I have reviewed the following from the nursing documentation.    No past medical history on file.  No past surgical history on file.  No family history on file.  Social History     Socioeconomic History    Marital status: Not on file     Spouse name: Not on file    Number of children: Not on file    Years of education: Not on file    Highest education level: Not on file   Occupational History    Not on file   Tobacco Use    Smoking status: Not on file    Smokeless tobacco: Not on file   Substance and Sexual Activity    Alcohol use: Not on file    Drug use: Not on file    Sexual activity: Not on file   Other Topics Concern    Not on file   Social History Narrative    Not on file     Social Determinants of Health     Financial Resource Strain: Not on file   Food Insecurity: Not on file   Transportation Needs: Not on file   Physical Activity: Not on file   Stress: Not on file   Social Connections: Not on file   Intimate Partner Violence: Not on file   Housing Stability: Not on file     Current Facility-Administered Medications   Medication Dose Route Frequency Provider Last Rate Last Admin    ondansetron (ZOFRAN) 4 MG/2ML injection             lactated ringers bolus 1,000 mL  1,000 mL IntraVENous Once Demetrius Jimenez, DO         No current outpatient medications on file.     Not on File    REVIEW OF SYSTEMS  10 systems reviewed, pertinent positives per HPI otherwise noted to be

## 2024-10-04 NOTE — H&P
Hospital Medicine History & Physical      Date of Admission: 10/3/2024    Date of Service:  Pt seen/examined on 10/3/2024    [x]Admitted to Inpatient with expected LOS greater than two midnights due to medical therapy.  []Placed in Observation status.    Chief Admission Complaint: Nausea/vomiting with hyperglycemia    Presenting Admission History:      44 y.o. male with history of type 1 diabetes, who presented to the ER with complaints of nausea vomiting with his glucometer reading significantly elevated glucose levels.  Patient was feeling very tired and not able to provide much history.  Apparently family member was at bedside, patient had changed his insulin pump yesterday.  She reported shortly afterwards the CGM monitoring device was noncommunicative but with the insulin pump as result the setting for positive to accurately measure patient's serum glucose level and correct as appropriate.  Workup in the ER revealed patient was in DKA.    Assessment/Plan:      Current Principal Problem:  DKA, type 1, not at goal (HCC)    DKA: Start patient on IV insulin drip per DKA protocol.  Place in the ICU for critical care management.  Monitor and replace electrolytes per protocol.  Aggressive IV fluid resuscitation per protocol.  Monitor strict I's and O's.        Discussed management and the need for Hospitalization of the patient w/ the Emergency Department Provider      Physical Exam Performed:      /77   Pulse (!) 122   Temp 98 °F (36.7 °C)   Resp 19   Ht 1.676 m (5' 6\")   Wt 80.7 kg (178 lb)   SpO2 99%   BMI 28.73 kg/m²     General appearance: Looks fatigued, appears stated age and cooperative.  HEENT:  Pupils equal, round, and reactive to light. Conjunctivae/corneas clear.  Respiratory:  Normal respiratory effort. Clear to auscultation, bilaterally without Rales/Wheezes/Rhonchi.  Cardiovascular:  Regular rate and rhythm with normal S1/S2 without murmurs, rubs or gallops.  Abdomen:  Soft, non-tender,      10/03/24  2213   *   K 4.6   CL 90*   CO2 19*   BUN 34*   CREATININE 2.2*   CALCIUM 9.4     Recent Labs     10/03/24  2213   TROPHS 24*     No results for input(s): \"LABA1C\" in the last 72 hours.  Recent Labs     10/03/24  2213   AST 20   ALT 13   BILITOT 1.2*   ALKPHOS 138*     Recent Labs     10/03/24  2213   INR 0.94   LACTA 3.3*        Harlan Wick MD

## 2024-10-05 ENCOUNTER — APPOINTMENT (OUTPATIENT)
Dept: GENERAL RADIOLOGY | Age: 44
DRG: 637 | End: 2024-10-05
Payer: COMMERCIAL

## 2024-10-05 PROBLEM — J96.01 ACUTE HYPOXEMIC RESPIRATORY FAILURE: Status: ACTIVE | Noted: 2024-10-05

## 2024-10-05 LAB
ANION GAP SERPL CALCULATED.3IONS-SCNC: 11 MMOL/L (ref 3–16)
ANION GAP SERPL CALCULATED.3IONS-SCNC: 12 MMOL/L (ref 3–16)
ANION GAP SERPL CALCULATED.3IONS-SCNC: 12 MMOL/L (ref 3–16)
BASOPHILS # BLD: 0 K/UL (ref 0–0.2)
BASOPHILS NFR BLD: 0.3 %
BUN SERPL-MCNC: 28 MG/DL (ref 7–20)
BUN SERPL-MCNC: 34 MG/DL (ref 7–20)
BUN SERPL-MCNC: 39 MG/DL (ref 7–20)
CALCIUM SERPL-MCNC: 7.9 MG/DL (ref 8.3–10.6)
CALCIUM SERPL-MCNC: 8.2 MG/DL (ref 8.3–10.6)
CALCIUM SERPL-MCNC: 8.4 MG/DL (ref 8.3–10.6)
CHLORIDE SERPL-SCNC: 108 MMOL/L (ref 99–110)
CHLORIDE SERPL-SCNC: 109 MMOL/L (ref 99–110)
CHLORIDE SERPL-SCNC: 110 MMOL/L (ref 99–110)
CO2 SERPL-SCNC: 22 MMOL/L (ref 21–32)
CO2 SERPL-SCNC: 23 MMOL/L (ref 21–32)
CO2 SERPL-SCNC: 23 MMOL/L (ref 21–32)
CREAT SERPL-MCNC: 2 MG/DL (ref 0.9–1.3)
CREAT SERPL-MCNC: 2 MG/DL (ref 0.9–1.3)
CREAT SERPL-MCNC: 2.2 MG/DL (ref 0.9–1.3)
DEPRECATED RDW RBC AUTO: 12.9 % (ref 12.4–15.4)
EOSINOPHIL # BLD: 0 K/UL (ref 0–0.6)
EOSINOPHIL NFR BLD: 0.2 %
EST. AVERAGE GLUCOSE BLD GHB EST-MCNC: 182.9 MG/DL
GFR SERPLBLD CREATININE-BSD FMLA CKD-EPI: 36 ML/MIN/{1.73_M2}
GFR SERPLBLD CREATININE-BSD FMLA CKD-EPI: 41 ML/MIN/{1.73_M2}
GFR SERPLBLD CREATININE-BSD FMLA CKD-EPI: 41 ML/MIN/{1.73_M2}
GLUCOSE BLD-MCNC: 152 MG/DL (ref 70–99)
GLUCOSE BLD-MCNC: 153 MG/DL (ref 70–99)
GLUCOSE BLD-MCNC: 166 MG/DL (ref 70–99)
GLUCOSE BLD-MCNC: 167 MG/DL (ref 70–99)
GLUCOSE SERPL-MCNC: 173 MG/DL (ref 70–99)
GLUCOSE SERPL-MCNC: 177 MG/DL (ref 70–99)
GLUCOSE SERPL-MCNC: 177 MG/DL (ref 70–99)
HBA1C MFR BLD: 8 %
HCT VFR BLD AUTO: 34.9 % (ref 40.5–52.5)
HGB BLD-MCNC: 12.3 G/DL (ref 13.5–17.5)
LYMPHOCYTES # BLD: 0.9 K/UL (ref 1–5.1)
LYMPHOCYTES NFR BLD: 7.6 %
MAGNESIUM SERPL-MCNC: 1.7 MG/DL (ref 1.8–2.4)
MCH RBC QN AUTO: 31.8 PG (ref 26–34)
MCHC RBC AUTO-ENTMCNC: 35.1 G/DL (ref 31–36)
MCV RBC AUTO: 90.5 FL (ref 80–100)
MONOCYTES # BLD: 0.8 K/UL (ref 0–1.3)
MONOCYTES NFR BLD: 6.4 %
NEUTROPHILS # BLD: 10.5 K/UL (ref 1.7–7.7)
NEUTROPHILS NFR BLD: 85.5 %
PERFORMED ON: ABNORMAL
PHOSPHATE SERPL-MCNC: 2.3 MG/DL (ref 2.5–4.9)
PLATELET # BLD AUTO: 221 K/UL (ref 135–450)
PMV BLD AUTO: 9.4 FL (ref 5–10.5)
POTASSIUM SERPL-SCNC: 3.7 MMOL/L (ref 3.5–5.1)
POTASSIUM SERPL-SCNC: 3.9 MMOL/L (ref 3.5–5.1)
POTASSIUM SERPL-SCNC: 3.9 MMOL/L (ref 3.5–5.1)
PROCALCITONIN SERPL IA-MCNC: 28.55 NG/ML (ref 0–0.15)
RBC # BLD AUTO: 3.85 M/UL (ref 4.2–5.9)
SODIUM SERPL-SCNC: 142 MMOL/L (ref 136–145)
SODIUM SERPL-SCNC: 143 MMOL/L (ref 136–145)
SODIUM SERPL-SCNC: 145 MMOL/L (ref 136–145)
WBC # BLD AUTO: 12.3 K/UL (ref 4–11)

## 2024-10-05 PROCEDURE — 2000000000 HC ICU R&B

## 2024-10-05 PROCEDURE — 36415 COLL VENOUS BLD VENIPUNCTURE: CPT

## 2024-10-05 PROCEDURE — 6370000000 HC RX 637 (ALT 250 FOR IP): Performed by: INTERNAL MEDICINE

## 2024-10-05 PROCEDURE — 99232 SBSQ HOSP IP/OBS MODERATE 35: CPT | Performed by: INTERNAL MEDICINE

## 2024-10-05 PROCEDURE — 94761 N-INVAS EAR/PLS OXIMETRY MLT: CPT

## 2024-10-05 PROCEDURE — 71045 X-RAY EXAM CHEST 1 VIEW: CPT

## 2024-10-05 PROCEDURE — 99233 SBSQ HOSP IP/OBS HIGH 50: CPT | Performed by: INTERNAL MEDICINE

## 2024-10-05 PROCEDURE — 6370000000 HC RX 637 (ALT 250 FOR IP): Performed by: FAMILY MEDICINE

## 2024-10-05 PROCEDURE — 2580000003 HC RX 258: Performed by: INTERNAL MEDICINE

## 2024-10-05 PROCEDURE — 83735 ASSAY OF MAGNESIUM: CPT

## 2024-10-05 PROCEDURE — 6360000002 HC RX W HCPCS: Performed by: INTERNAL MEDICINE

## 2024-10-05 PROCEDURE — 85025 COMPLETE CBC W/AUTO DIFF WBC: CPT

## 2024-10-05 PROCEDURE — 84145 PROCALCITONIN (PCT): CPT

## 2024-10-05 PROCEDURE — 6360000002 HC RX W HCPCS: Performed by: FAMILY MEDICINE

## 2024-10-05 PROCEDURE — 84100 ASSAY OF PHOSPHORUS: CPT

## 2024-10-05 PROCEDURE — 2500000003 HC RX 250 WO HCPCS: Performed by: FAMILY MEDICINE

## 2024-10-05 PROCEDURE — 94640 AIRWAY INHALATION TREATMENT: CPT

## 2024-10-05 PROCEDURE — 80048 BASIC METABOLIC PNL TOTAL CA: CPT

## 2024-10-05 PROCEDURE — 2580000003 HC RX 258: Performed by: FAMILY MEDICINE

## 2024-10-05 PROCEDURE — 2700000000 HC OXYGEN THERAPY PER DAY

## 2024-10-05 RX ORDER — METOCLOPRAMIDE HYDROCHLORIDE 5 MG/ML
5 INJECTION INTRAMUSCULAR; INTRAVENOUS EVERY 6 HOURS PRN
Status: DISCONTINUED | OUTPATIENT
Start: 2024-10-05 | End: 2024-10-06

## 2024-10-05 RX ORDER — ALBUTEROL SULFATE 0.83 MG/ML
2.5 SOLUTION RESPIRATORY (INHALATION) EVERY 6 HOURS PRN
Status: DISCONTINUED | OUTPATIENT
Start: 2024-10-05 | End: 2024-10-05

## 2024-10-05 RX ORDER — GUAIFENESIN/DEXTROMETHORPHAN 100-10MG/5
5 SYRUP ORAL EVERY 4 HOURS PRN
Status: DISCONTINUED | OUTPATIENT
Start: 2024-10-05 | End: 2024-10-09 | Stop reason: HOSPADM

## 2024-10-05 RX ORDER — MAGNESIUM SULFATE 1 G/100ML
1000 INJECTION INTRAVENOUS ONCE
Status: COMPLETED | OUTPATIENT
Start: 2024-10-05 | End: 2024-10-05

## 2024-10-05 RX ORDER — ALBUTEROL SULFATE 90 UG/1
2 INHALANT RESPIRATORY (INHALATION) EVERY 6 HOURS PRN
Status: DISCONTINUED | OUTPATIENT
Start: 2024-10-05 | End: 2024-10-09 | Stop reason: HOSPADM

## 2024-10-05 RX ORDER — ALBUTEROL SULFATE 0.83 MG/ML
2.5 SOLUTION RESPIRATORY (INHALATION) EVERY 4 HOURS PRN
Status: DISCONTINUED | OUTPATIENT
Start: 2024-10-05 | End: 2024-10-09 | Stop reason: HOSPADM

## 2024-10-05 RX ORDER — DEXTROMETHORPHAN HYDROBROMIDE AND PROMETHAZINE HYDROCHLORIDE 15; 6.25 MG/5ML; MG/5ML
5 SYRUP ORAL EVERY 4 HOURS PRN
Status: DISPENSED | OUTPATIENT
Start: 2024-10-05 | End: 2024-10-08

## 2024-10-05 RX ORDER — BENZONATATE 100 MG/1
100 CAPSULE ORAL 3 TIMES DAILY PRN
Status: DISCONTINUED | OUTPATIENT
Start: 2024-10-05 | End: 2024-10-09 | Stop reason: HOSPADM

## 2024-10-05 RX ORDER — CALCIUM GLUCONATE 20 MG/ML
2000 INJECTION, SOLUTION INTRAVENOUS ONCE
Status: COMPLETED | OUTPATIENT
Start: 2024-10-05 | End: 2024-10-05

## 2024-10-05 RX ORDER — METOCLOPRAMIDE HYDROCHLORIDE 5 MG/ML
5 INJECTION INTRAMUSCULAR; INTRAVENOUS EVERY 6 HOURS
Status: DISCONTINUED | OUTPATIENT
Start: 2024-10-05 | End: 2024-10-05

## 2024-10-05 RX ADMIN — Medication 1 LOZENGE: at 04:44

## 2024-10-05 RX ADMIN — ALBUTEROL SULFATE 2.5 MG: 2.5 SOLUTION RESPIRATORY (INHALATION) at 17:21

## 2024-10-05 RX ADMIN — MUPIROCIN: 20 OINTMENT TOPICAL at 20:04

## 2024-10-05 RX ADMIN — ONDANSETRON 4 MG: 2 INJECTION INTRAMUSCULAR; INTRAVENOUS at 23:46

## 2024-10-05 RX ADMIN — SODIUM PHOSPHATE, MONOBASIC, MONOHYDRATE AND SODIUM PHOSPHATE, DIBASIC, ANHYDROUS 15 MMOL: 142; 276 INJECTION, SOLUTION INTRAVENOUS at 06:16

## 2024-10-05 RX ADMIN — GUAIFENESIN AND DEXTROMETHORPHAN 5 ML: 100; 10 SYRUP ORAL at 02:25

## 2024-10-05 RX ADMIN — MAGNESIUM SULFATE IN DEXTROSE 1000 MG: 10 INJECTION, SOLUTION INTRAVENOUS at 08:29

## 2024-10-05 RX ADMIN — ALBUTEROL SULFATE 2.5 MG: 2.5 SOLUTION RESPIRATORY (INHALATION) at 13:01

## 2024-10-05 RX ADMIN — CALCIUM GLUCONATE 2000 MG: 20 INJECTION, SOLUTION INTRAVENOUS at 01:31

## 2024-10-05 RX ADMIN — AMPICILLIN SODIUM AND SULBACTAM SODIUM 3000 MG: 2; 1 INJECTION, POWDER, FOR SOLUTION INTRAMUSCULAR; INTRAVENOUS at 09:35

## 2024-10-05 RX ADMIN — METOCLOPRAMIDE HYDROCHLORIDE 5 MG: 5 INJECTION INTRAMUSCULAR; INTRAVENOUS at 20:12

## 2024-10-05 RX ADMIN — PANTOPRAZOLE SODIUM 40 MG: 40 INJECTION, POWDER, FOR SOLUTION INTRAVENOUS at 09:38

## 2024-10-05 RX ADMIN — SODIUM CHLORIDE, PRESERVATIVE FREE 10 ML: 5 INJECTION INTRAVENOUS at 20:05

## 2024-10-05 RX ADMIN — ENOXAPARIN SODIUM 40 MG: 100 INJECTION SUBCUTANEOUS at 08:30

## 2024-10-05 RX ADMIN — METOCLOPRAMIDE HYDROCHLORIDE 5 MG: 5 INJECTION INTRAMUSCULAR; INTRAVENOUS at 08:30

## 2024-10-05 RX ADMIN — Medication 1 LOZENGE: at 02:25

## 2024-10-05 RX ADMIN — AMPICILLIN SODIUM AND SULBACTAM SODIUM 3000 MG: 2; 1 INJECTION, POWDER, FOR SOLUTION INTRAMUSCULAR; INTRAVENOUS at 20:04

## 2024-10-05 RX ADMIN — MUPIROCIN: 20 OINTMENT TOPICAL at 08:30

## 2024-10-05 RX ADMIN — ONDANSETRON 4 MG: 2 INJECTION INTRAMUSCULAR; INTRAVENOUS at 11:36

## 2024-10-05 RX ADMIN — GUAIFENESIN AND DEXTROMETHORPHAN 5 ML: 100; 10 SYRUP ORAL at 20:11

## 2024-10-05 RX ADMIN — MORPHINE SULFATE 2 MG: 2 INJECTION, SOLUTION INTRAMUSCULAR; INTRAVENOUS at 01:23

## 2024-10-05 RX ADMIN — BENZONATATE 100 MG: 100 CAPSULE ORAL at 20:12

## 2024-10-05 RX ADMIN — BENZONATATE 100 MG: 100 CAPSULE ORAL at 08:30

## 2024-10-05 RX ADMIN — ONDANSETRON 4 MG: 2 INJECTION INTRAMUSCULAR; INTRAVENOUS at 06:01

## 2024-10-05 RX ADMIN — MORPHINE SULFATE 2 MG: 2 INJECTION, SOLUTION INTRAMUSCULAR; INTRAVENOUS at 17:17

## 2024-10-05 RX ADMIN — ALBUTEROL SULFATE 2.5 MG: 2.5 SOLUTION RESPIRATORY (INHALATION) at 04:55

## 2024-10-05 RX ADMIN — AMPICILLIN SODIUM AND SULBACTAM SODIUM 3000 MG: 2; 1 INJECTION, POWDER, FOR SOLUTION INTRAMUSCULAR; INTRAVENOUS at 14:16

## 2024-10-05 RX ADMIN — PROMETHAZINE HYDROCHLORIDE AND DEXTROMETHORPHAN HYDROBROMIDE ORAL SOLUTION 5 ML: 15; 6.25 SOLUTION ORAL at 21:51

## 2024-10-05 RX ADMIN — MORPHINE SULFATE 2 MG: 2 INJECTION, SOLUTION INTRAMUSCULAR; INTRAVENOUS at 06:00

## 2024-10-05 RX ADMIN — SODIUM CHLORIDE, PRESERVATIVE FREE 10 ML: 5 INJECTION INTRAVENOUS at 08:35

## 2024-10-05 RX ADMIN — MORPHINE SULFATE 2 MG: 2 INJECTION, SOLUTION INTRAMUSCULAR; INTRAVENOUS at 11:36

## 2024-10-05 RX ADMIN — METOCLOPRAMIDE HYDROCHLORIDE 5 MG: 5 INJECTION INTRAMUSCULAR; INTRAVENOUS at 14:15

## 2024-10-05 RX ADMIN — GUAIFENESIN AND DEXTROMETHORPHAN 5 ML: 100; 10 SYRUP ORAL at 10:17

## 2024-10-05 RX ADMIN — GUAIFENESIN AND DEXTROMETHORPHAN 5 ML: 100; 10 SYRUP ORAL at 17:17

## 2024-10-05 RX ADMIN — ONDANSETRON 4 MG: 2 INJECTION INTRAMUSCULAR; INTRAVENOUS at 01:24

## 2024-10-05 RX ADMIN — Medication 1 LOZENGE: at 20:11

## 2024-10-05 ASSESSMENT — PAIN DESCRIPTION - ORIENTATION
ORIENTATION: MID;INNER
ORIENTATION: MID
ORIENTATION: MID
ORIENTATION: MID;INNER

## 2024-10-05 ASSESSMENT — PAIN DESCRIPTION - LOCATION
LOCATION: CHEST
LOCATION: CHEST;THROAT
LOCATION: CHEST;THROAT
LOCATION: THROAT
LOCATION: CHEST
LOCATION: THROAT
LOCATION: THROAT

## 2024-10-05 ASSESSMENT — PAIN SCALES - GENERAL
PAINLEVEL_OUTOF10: 5
PAINLEVEL_OUTOF10: 5
PAINLEVEL_OUTOF10: 0
PAINLEVEL_OUTOF10: 6
PAINLEVEL_OUTOF10: 0
PAINLEVEL_OUTOF10: 6
PAINLEVEL_OUTOF10: 4

## 2024-10-05 ASSESSMENT — PAIN DESCRIPTION - DESCRIPTORS
DESCRIPTORS: ACHING;DISCOMFORT
DESCRIPTORS: ACHING;DISCOMFORT
DESCRIPTORS: ACHING
DESCRIPTORS: ACHING;DISCOMFORT
DESCRIPTORS: ACHING;DISCOMFORT

## 2024-10-05 ASSESSMENT — PAIN - FUNCTIONAL ASSESSMENT
PAIN_FUNCTIONAL_ASSESSMENT: ACTIVITIES ARE NOT PREVENTED

## 2024-10-05 NOTE — PROGRESS NOTES
Patient has terrible cough and throat pain, notified Nocturnist, PRN robitussin and cepacol given as ordered.

## 2024-10-05 NOTE — PROGRESS NOTES
AM assessment complete, see flowsheet. Medications given per MAR. Pt A&O x4, multiple bouts of emesis. Tachy on the monitor, elevated BP. New orders for reglan PRN added. Given at this time. Increased cough and chest congestion overnight. O2 requirements increased as well. Currently on 6L NC. Pt remains lethargic, but appropriate.     Insulin pump infusing, pt tolerating well. No other needs noted, awaiting MD rounds.

## 2024-10-05 NOTE — ACP (ADVANCE CARE PLANNING)
Advance Care Planning     General Advance Care Planning (ACP) Conversation    Date of Conversation: 10/5/2024  Conducted with: Patient with Decision Making Capacity  Other persons present: Spouse Taisha    Healthcare Decision Maker: No healthcare decision makers have been documented.       Content/Action Overview:  DECLINED ACP Conversation - will revisit periodically  Reviewed DNR/DNI and patient elects Full Code (Attempt Resuscitation)        Length of Voluntary ACP Conversation in minutes:  <16 minutes (Non-Billable)    Shalonda Boggs RN

## 2024-10-05 NOTE — PROGRESS NOTES
IM Progress Note    Admit Date:  10/3/2024  1    Interval history:  DKA , hx of TYpe 1 DM with CKD     Improved sugars and resolved anion gap , transitioned to home insulin pump  Nausea remains, developed cough and hypoxia overnight with chest pains      Subjective:  Mr. Doe seen up in bed, feels sick and tired, s  Wheezing and cough, sob overnight with intermittent chest pains with cough   No fevers  Now on 5 L       Objective:   BP (!) 182/86   Pulse (!) 119   Temp 98.7 °F (37.1 °C) (Oral)   Resp 26   Ht 1.676 m (5' 6\")   Wt 81 kg (178 lb 9.2 oz)   SpO2 90%   BMI 28.82 kg/m²     Intake/Output Summary (Last 24 hours) at 10/5/2024 0721  Last data filed at 10/5/2024 0400  Gross per 24 hour   Intake 4075.97 ml   Output 1550 ml   Net 2525.97 ml       Physical Exam:      General:  middle aged male ill appearing  Awake, alert and oriented. Appears to be not in any distress  Mucous Membranes:  Pink , anicteric  Neck: No JVD, no carotid bruit, no thyromegaly  Chest: diminished right upper lobe with scattered wheeze   Cardiovascular:  RRR S1S2 heard, no murmurs or gallops  Abdomen:  Soft, undistended, mid epigastric tenderness  , no organomegaly, BS present  Extremities: No edema or cyanosis. Distal pulses well felt  Neurological : grossly normal non focal     Medications:   Scheduled Medications:    sodium chloride flush  5-40 mL IntraVENous 2 times per day    enoxaparin  40 mg SubCUTAneous Daily    mupirocin   Each Nostril BID    Insulin Pump - Bolus Dose   SubCUTAneous 4x Daily AC & HS    Insulin Pump - Basal Dose   SubCUTAneous Daily     I   sodium chloride 5 mL/hr at 10/04/24 1536    dextrose 5% and 0.45% NaCl with KCl 20 mEq Stopped (10/04/24 1518)    sodium chloride 75 mL/hr at 10/05/24 0157    dextrose      dextrose       guaiFENesin-dextromethorphan, Benzocaine-Menthol, albuterol, sodium chloride flush, sodium chloride, ondansetron **OR** ondansetron, polyethylene glycol, acetaminophen **OR**  acetaminophen, potassium chloride, magnesium sulfate, sodium phosphate 15 mmol in sodium chloride 0.9 % 250 mL IVPB, dextrose 5% and 0.45% NaCl with KCl 20 mEq, glucose, dextrose bolus **OR** dextrose bolus, glucagon (rDNA), dextrose, morphine, glucose, dextrose bolus **OR** dextrose bolus, dextrose    Lab Data:  Recent Labs     10/03/24  2213 10/05/24  0500   WBC 16.0* 12.3*   HGB 14.5 12.3*   HCT 43.6 34.9*   MCV 92.7 90.5    221     Recent Labs     10/04/24  0735 10/04/24  1104 10/04/24  2323 10/05/24  0500    142 143 142   K 3.3* 3.4* 3.9 3.9    108 109 108   CO2 23 25 23 22   PHOS 0.7* 1.2*  --  2.3*   BUN 41* 43* 39* 34*   CREATININE 2.8* 2.8* 2.2* 2.0*     No results for input(s): \"CKTOTAL\", \"CKMB\", \"CKMBINDEX\", \"TROPONINI\" in the last 72 hours.    Coagulation:   Lab Results   Component Value Date/Time    INR 0.94 10/03/2024 10:13 PM     Cardiac markers: No results found for: \"CKMB\", \"CKTOTAL\", \"TROPONINI\", \"MYOGLOBIN\"      Lab Results   Component Value Date    ALT 13 10/03/2024    AST 20 10/03/2024    ALKPHOS 138 (H) 10/03/2024    BILITOT 1.2 (H) 10/03/2024       Lab Results   Component Value Date    INR 0.94 10/03/2024    PROTIME 12.8 10/03/2024       Radiology      XR CHEST PORTABLE   Final Result   Airspace opacity in the left mid and lower lung zone consistent with   infiltrate.  Consider CT scan to further characterize.         XR ABDOMEN (KUB) (SINGLE AP VIEW)   Final Result   No acute findings.  No evidence of bowel obstruction         XR CHEST PORTABLE   Final Result   There is no evidence of acute chest disease.           Hemoglobin A1C   Date Value Ref Range Status   10/04/2024 7.7 See comment % Final     Comment:     Comment:  Diagnosis of Diabetes: > or = 6.5%  Increased risk of diabetes (Prediabetes): 5.7-6.4%  Glycemic Control: Nonpregnant Adults: <7.0%                    Pregnant: <6.0%               Assessment & Plan:      DKA with hx of DM 1    On insulin pump and f/w

## 2024-10-05 NOTE — PROGRESS NOTES
4 Eyes Skin Assessment     NAME:  Russell Doe  YOB: 1980  MEDICAL RECORD NUMBER:  [id number]    The patient is being assessed for  Other shift    I agree that at least one RN has performed a thorough Head to Toe Skin Assessment on the patient. ALL assessment sites listed below have been assessed.      Areas assessed by both nurses:    Head, Face, Ears, Shoulders, Back, Chest, Arms, Elbows, Hands, Sacrum. Buttock, Coccyx, Ischium, Legs. Feet and Heels, and Under Medical Devices         Does the Patient have a Wound? No noted wound(s)       Seth Prevention initiated by RN: Yes  Wound Care Orders initiated by RN: No    Pressure Injury (Stage 3,4, Unstageable, DTI, NWPT, and Complex wounds) if present, place Wound referral order by RN under : No    New Ostomies, if present place, Ostomy referral order under : No     Nurse 1 eSignature: Electronically signed by Elizabeth Grimaldo RN on 10/4/24 at 8:56 PM EDT    **SHARE this note so that the co-signing nurse can place an eSignature**    Nurse 2 eSignature: Electronically signed by Fly Shirley RN on 10/5/24 at 6:30 AM EDT

## 2024-10-05 NOTE — PROGRESS NOTES
Increased in oxygen requirements , O2 bumped up to 5 LPM Oxygen saturation @ 93%. Patient stated that his cough was not relieved with robitussin and cepacol lozenges, Notified nocturnist , nebulizer albuterol given as ordered.

## 2024-10-05 NOTE — PROGRESS NOTES
Shift assessment completed (see flowsheet).  Patient is alert and oriented X4, with mild sleepiness.  On 2 LPM NC.  Patient owns home insulin pump.  Infusin mls/hr NSS    IV access:    PIV right AC  PIV left AC

## 2024-10-05 NOTE — PROGRESS NOTES
Pt having continuous bouts of n/v. Given reglan and zofran around the clock to no avail. BG remains stable and on home insulin pump. No appetite, IV fluids running. Breathing treatments x2 this afternoon. Pt overall uncomfortable, tachy with elevated BP. PRN morphine given as well. Wife at the bedside. Will continue to monitor.

## 2024-10-05 NOTE — PROGRESS NOTES
RT Inhaler-Nebulizer Bronchodilator Protocol Note    There is a bronchodilator order in the chart from a provider indicating to follow the RT Bronchodilator Protocol and there is an “Initiate RT Inhaler-Nebulizer Bronchodilator Protocol” order as well (see protocol at bottom of note).    CXR Findings:  XR CHEST PORTABLE    Result Date: 10/4/2024  There is no evidence of acute chest disease.       The findings from the last RT Protocol Assessment were as follows:   History Pulmonary Disease: (P) None or smoker <15 pack years  Respiratory Pattern: (P) Regular pattern and RR 12-20 bpm  Breath Sounds: (P) Slightly diminished and/or crackles  Cough: (P) Strong, spontaneous, non-productive  Indication for Bronchodilator Therapy: (P) Decreased or absent breath sounds  Bronchodilator Assessment Score: (P) 2    Aerosolized bronchodilator medication orders have been revised according to the RT Inhaler-Nebulizer Bronchodilator Protocol below.    Respiratory Therapist to perform RT Therapy Protocol Assessment initially then follow the protocol.  Repeat RT Therapy Protocol Assessment PRN for score 0-3 or on second treatment, BID, and PRN for scores above 3.    No Indications - adjust the frequency to every 6 hours PRN wheezing or bronchospasm, if no treatments needed after 48 hours then discontinue using Per Protocol order mode.     If indication present, adjust the RT bronchodilator orders based on the Bronchodilator Assessment Score as indicated below.  Use Inhaler orders unless patient has one or more of the following: on home nebulizer, not able to hold breath for 10 seconds, is not alert and oriented, cannot activate and use MDI correctly, or respiratory rate 25 breaths per minute or more, then use the equivalent nebulizer order(s) with same Frequency and PRN reasons based on the score.  If a patient is on this medication at home then do not decrease Frequency below that used at home.    0-3 - enter or revise RT

## 2024-10-05 NOTE — PROGRESS NOTES
[facility]                                 Pulmonary/Critical care  Consult /Progress Note :                                                                  CC :DKA  Patient is being seen at the request of Dr STANFORD  for a consultation for DKA     Subjective   Still nauseated  Out of DKA  Using his pimp  Not eating  On NS 75   Lipase N       PHYSICAL EXAM:  Vitals:    10/05/24 0800   BP: (!) 177/88   Pulse: (!) 115   Resp:    Temp:    SpO2: 90%     Gen: No distress.   Eyes: PERRL. No sclera icterus. No conjunctival injection.   ENT: No discharge. Pharynx clear.   Neck: Trachea midline. No obvious mass.    Resp: No accessory muscle use. No crackles. No wheezes. No rhonchi. No dullness on percussion.  CV: Regular rate. Regular rhythm. No murmur or rub. No edema. Peripheral pulses are 2+.  Capillary refill is less than 3 seconds.  GI: Non-tender. Non-distended. No hernia.   Skin: Warm and dry. No nodule on exposed extremities.   Lymph: No cervical LAD. No supraclavicular LAD.   M/S: No cyanosis. No joint deformity. No clubbing.   Neuro: Awake. Alert. Moves all four extremities.   Psych: Oriented x 3. No anxiety.     LABS:  CBC:   Recent Labs     10/03/24  2213 10/05/24  0500   WBC 16.0* 12.3*   HGB 14.5 12.3*   HCT 43.6 34.9*   MCV 92.7 90.5    221     BMP:   Recent Labs     10/04/24  0735 10/04/24  1104 10/04/24  2323 10/05/24  0500    142 143 142   K 3.3* 3.4* 3.9 3.9    108 109 108   CO2 23 25 23 22   PHOS 0.7* 1.2*  --  2.3*   BUN 41* 43* 39* 34*   CREATININE 2.8* 2.8* 2.2* 2.0*     LIVER PROFILE:   Recent Labs     10/03/24  2213   AST 20   ALT 13   LIPASE 17.0   BILITOT 1.2*   ALKPHOS 138*     PT/INR:   Recent Labs     10/03/24  2213   PROTIME 12.8   INR 0.94         Microbiology:  sent    Imaging:  Chest imaging was reviewed by me and showed   Reviewed       CXR sowing new left side infiltrate

## 2024-10-05 NOTE — PROGRESS NOTES
Patient is very anxious at this time , complaining of chest tightness and terrible cough, he feels like he gonna die. Portable chest xray ordered and notified the nocturnist. Patient is very nauseus  and not knowledgeable enough to show his pump settings from his own insulin pump.  On 6  LPM NC, afebrile. IV accessess remain intact. Was able to use urinal to void. Bed in lowest position, call light within reach. Morphine  and zofran given as needed.      Pump settings;     Basal Rate:     7937-8018 1.2  2886-7031 1.4  4357-0434 1.2     Humalog      Carb Ratio:     7180-1977 7  4377-6070 6  4999-7208 10

## 2024-10-06 LAB
ANION GAP SERPL CALCULATED.3IONS-SCNC: 10 MMOL/L (ref 3–16)
ANION GAP SERPL CALCULATED.3IONS-SCNC: 11 MMOL/L (ref 3–16)
ANION GAP SERPL CALCULATED.3IONS-SCNC: 12 MMOL/L (ref 3–16)
ANION GAP SERPL CALCULATED.3IONS-SCNC: 12 MMOL/L (ref 3–16)
BASOPHILS # BLD: 0 K/UL (ref 0–0.2)
BASOPHILS NFR BLD: 0.4 %
BUN SERPL-MCNC: 19 MG/DL (ref 7–20)
BUN SERPL-MCNC: 19 MG/DL (ref 7–20)
BUN SERPL-MCNC: 20 MG/DL (ref 7–20)
BUN SERPL-MCNC: 21 MG/DL (ref 7–20)
CALCIUM SERPL-MCNC: 7.9 MG/DL (ref 8.3–10.6)
CALCIUM SERPL-MCNC: 8.3 MG/DL (ref 8.3–10.6)
CALCIUM SERPL-MCNC: 8.3 MG/DL (ref 8.3–10.6)
CALCIUM SERPL-MCNC: 8.4 MG/DL (ref 8.3–10.6)
CHLORIDE SERPL-SCNC: 107 MMOL/L (ref 99–110)
CHLORIDE SERPL-SCNC: 107 MMOL/L (ref 99–110)
CHLORIDE SERPL-SCNC: 108 MMOL/L (ref 99–110)
CHLORIDE SERPL-SCNC: 108 MMOL/L (ref 99–110)
CO2 SERPL-SCNC: 26 MMOL/L (ref 21–32)
CO2 SERPL-SCNC: 26 MMOL/L (ref 21–32)
CO2 SERPL-SCNC: 27 MMOL/L (ref 21–32)
CO2 SERPL-SCNC: 27 MMOL/L (ref 21–32)
CREAT SERPL-MCNC: 1.5 MG/DL (ref 0.9–1.3)
CREAT SERPL-MCNC: 1.7 MG/DL (ref 0.9–1.3)
DEPRECATED RDW RBC AUTO: 13.1 % (ref 12.4–15.4)
EOSINOPHIL # BLD: 0 K/UL (ref 0–0.6)
EOSINOPHIL NFR BLD: 0 %
GFR SERPLBLD CREATININE-BSD FMLA CKD-EPI: 50 ML/MIN/{1.73_M2}
GFR SERPLBLD CREATININE-BSD FMLA CKD-EPI: 58 ML/MIN/{1.73_M2}
GLUCOSE BLD-MCNC: 128 MG/DL (ref 70–99)
GLUCOSE BLD-MCNC: 131 MG/DL (ref 70–99)
GLUCOSE BLD-MCNC: 149 MG/DL (ref 70–99)
GLUCOSE SERPL-MCNC: 143 MG/DL (ref 70–99)
GLUCOSE SERPL-MCNC: 145 MG/DL (ref 70–99)
GLUCOSE SERPL-MCNC: 154 MG/DL (ref 70–99)
GLUCOSE SERPL-MCNC: 164 MG/DL (ref 70–99)
HCT VFR BLD AUTO: 36.2 % (ref 40.5–52.5)
HGB BLD-MCNC: 12.7 G/DL (ref 13.5–17.5)
LACTATE BLDV-SCNC: 1 MMOL/L (ref 0.4–2)
LYMPHOCYTES # BLD: 0.9 K/UL (ref 1–5.1)
LYMPHOCYTES NFR BLD: 8.5 %
MAGNESIUM SERPL-MCNC: 1.7 MG/DL (ref 1.8–2.4)
MCH RBC QN AUTO: 31.8 PG (ref 26–34)
MCHC RBC AUTO-ENTMCNC: 35.1 G/DL (ref 31–36)
MCV RBC AUTO: 90.6 FL (ref 80–100)
MONOCYTES # BLD: 0.8 K/UL (ref 0–1.3)
MONOCYTES NFR BLD: 7.8 %
NEUTROPHILS # BLD: 9 K/UL (ref 1.7–7.7)
NEUTROPHILS NFR BLD: 83.3 %
PERFORMED ON: ABNORMAL
PLATELET # BLD AUTO: 202 K/UL (ref 135–450)
PMV BLD AUTO: 9.4 FL (ref 5–10.5)
POTASSIUM SERPL-SCNC: 3.3 MMOL/L (ref 3.5–5.1)
POTASSIUM SERPL-SCNC: 3.4 MMOL/L (ref 3.5–5.1)
POTASSIUM SERPL-SCNC: 3.4 MMOL/L (ref 3.5–5.1)
POTASSIUM SERPL-SCNC: 3.6 MMOL/L (ref 3.5–5.1)
RBC # BLD AUTO: 4 M/UL (ref 4.2–5.9)
SODIUM SERPL-SCNC: 144 MMOL/L (ref 136–145)
SODIUM SERPL-SCNC: 145 MMOL/L (ref 136–145)
SODIUM SERPL-SCNC: 145 MMOL/L (ref 136–145)
SODIUM SERPL-SCNC: 147 MMOL/L (ref 136–145)
WBC # BLD AUTO: 10.8 K/UL (ref 4–11)

## 2024-10-06 PROCEDURE — 2000000000 HC ICU R&B

## 2024-10-06 PROCEDURE — 80048 BASIC METABOLIC PNL TOTAL CA: CPT

## 2024-10-06 PROCEDURE — 2580000003 HC RX 258: Performed by: INTERNAL MEDICINE

## 2024-10-06 PROCEDURE — 94640 AIRWAY INHALATION TREATMENT: CPT

## 2024-10-06 PROCEDURE — 83735 ASSAY OF MAGNESIUM: CPT

## 2024-10-06 PROCEDURE — 36415 COLL VENOUS BLD VENIPUNCTURE: CPT

## 2024-10-06 PROCEDURE — 6370000000 HC RX 637 (ALT 250 FOR IP): Performed by: FAMILY MEDICINE

## 2024-10-06 PROCEDURE — 2700000000 HC OXYGEN THERAPY PER DAY

## 2024-10-06 PROCEDURE — 2060000000 HC ICU INTERMEDIATE R&B

## 2024-10-06 PROCEDURE — 6360000002 HC RX W HCPCS: Performed by: FAMILY MEDICINE

## 2024-10-06 PROCEDURE — 94761 N-INVAS EAR/PLS OXIMETRY MLT: CPT

## 2024-10-06 PROCEDURE — 83605 ASSAY OF LACTIC ACID: CPT

## 2024-10-06 PROCEDURE — 6360000002 HC RX W HCPCS: Performed by: INTERNAL MEDICINE

## 2024-10-06 PROCEDURE — 85025 COMPLETE CBC W/AUTO DIFF WBC: CPT

## 2024-10-06 PROCEDURE — 2580000003 HC RX 258: Performed by: FAMILY MEDICINE

## 2024-10-06 PROCEDURE — 99232 SBSQ HOSP IP/OBS MODERATE 35: CPT | Performed by: INTERNAL MEDICINE

## 2024-10-06 PROCEDURE — 99233 SBSQ HOSP IP/OBS HIGH 50: CPT | Performed by: INTERNAL MEDICINE

## 2024-10-06 RX ORDER — METOCLOPRAMIDE HYDROCHLORIDE 5 MG/ML
5 INJECTION INTRAMUSCULAR; INTRAVENOUS EVERY 6 HOURS
Status: DISCONTINUED | OUTPATIENT
Start: 2024-10-06 | End: 2024-10-09 | Stop reason: HOSPADM

## 2024-10-06 RX ORDER — PROCHLORPERAZINE EDISYLATE 5 MG/ML
10 INJECTION INTRAMUSCULAR; INTRAVENOUS EVERY 6 HOURS PRN
Status: DISCONTINUED | OUTPATIENT
Start: 2024-10-06 | End: 2024-10-09 | Stop reason: HOSPADM

## 2024-10-06 RX ORDER — HYDRALAZINE HYDROCHLORIDE 20 MG/ML
10 INJECTION INTRAMUSCULAR; INTRAVENOUS ONCE
Status: COMPLETED | OUTPATIENT
Start: 2024-10-06 | End: 2024-10-06

## 2024-10-06 RX ORDER — LABETALOL HYDROCHLORIDE 5 MG/ML
10 INJECTION, SOLUTION INTRAVENOUS EVERY 6 HOURS PRN
Status: DISCONTINUED | OUTPATIENT
Start: 2024-10-06 | End: 2024-10-07

## 2024-10-06 RX ADMIN — MUPIROCIN: 20 OINTMENT TOPICAL at 20:35

## 2024-10-06 RX ADMIN — METOCLOPRAMIDE HYDROCHLORIDE 5 MG: 5 INJECTION INTRAMUSCULAR; INTRAVENOUS at 20:34

## 2024-10-06 RX ADMIN — ENOXAPARIN SODIUM 40 MG: 100 INJECTION SUBCUTANEOUS at 07:38

## 2024-10-06 RX ADMIN — Medication 1 LOZENGE: at 01:39

## 2024-10-06 RX ADMIN — ALBUTEROL SULFATE 2.5 MG: 2.5 SOLUTION RESPIRATORY (INHALATION) at 02:36

## 2024-10-06 RX ADMIN — PANTOPRAZOLE SODIUM 40 MG: 40 INJECTION, POWDER, FOR SOLUTION INTRAVENOUS at 07:38

## 2024-10-06 RX ADMIN — GUAIFENESIN AND DEXTROMETHORPHAN 5 ML: 100; 10 SYRUP ORAL at 20:35

## 2024-10-06 RX ADMIN — PROCHLORPERAZINE EDISYLATE 10 MG: 5 INJECTION INTRAMUSCULAR; INTRAVENOUS at 10:38

## 2024-10-06 RX ADMIN — AMPICILLIN SODIUM AND SULBACTAM SODIUM 3000 MG: 2; 1 INJECTION, POWDER, FOR SOLUTION INTRAMUSCULAR; INTRAVENOUS at 20:34

## 2024-10-06 RX ADMIN — MORPHINE SULFATE 2 MG: 2 INJECTION, SOLUTION INTRAMUSCULAR; INTRAVENOUS at 12:22

## 2024-10-06 RX ADMIN — PROMETHAZINE HYDROCHLORIDE AND DEXTROMETHORPHAN HYDROBROMIDE ORAL SOLUTION 5 ML: 15; 6.25 SOLUTION ORAL at 21:31

## 2024-10-06 RX ADMIN — LABETALOL HYDROCHLORIDE 10 MG: 5 INJECTION, SOLUTION INTRAVENOUS at 17:32

## 2024-10-06 RX ADMIN — PROMETHAZINE HYDROCHLORIDE AND DEXTROMETHORPHAN HYDROBROMIDE ORAL SOLUTION 5 ML: 15; 6.25 SOLUTION ORAL at 06:18

## 2024-10-06 RX ADMIN — AMPICILLIN SODIUM AND SULBACTAM SODIUM 3000 MG: 2; 1 INJECTION, POWDER, FOR SOLUTION INTRAMUSCULAR; INTRAVENOUS at 07:39

## 2024-10-06 RX ADMIN — PROMETHAZINE HYDROCHLORIDE AND DEXTROMETHORPHAN HYDROBROMIDE ORAL SOLUTION 5 ML: 15; 6.25 SOLUTION ORAL at 00:56

## 2024-10-06 RX ADMIN — HYDRALAZINE HYDROCHLORIDE 10 MG: 20 INJECTION INTRAMUSCULAR; INTRAVENOUS at 06:38

## 2024-10-06 RX ADMIN — METOCLOPRAMIDE HYDROCHLORIDE 5 MG: 5 INJECTION INTRAMUSCULAR; INTRAVENOUS at 07:38

## 2024-10-06 RX ADMIN — GUAIFENESIN AND DEXTROMETHORPHAN 5 ML: 100; 10 SYRUP ORAL at 01:39

## 2024-10-06 RX ADMIN — MUPIROCIN: 20 OINTMENT TOPICAL at 07:42

## 2024-10-06 RX ADMIN — METOCLOPRAMIDE HYDROCHLORIDE 5 MG: 5 INJECTION INTRAMUSCULAR; INTRAVENOUS at 01:39

## 2024-10-06 RX ADMIN — SODIUM CHLORIDE, PRESERVATIVE FREE 10 ML: 5 INJECTION INTRAVENOUS at 20:38

## 2024-10-06 RX ADMIN — PROCHLORPERAZINE EDISYLATE 10 MG: 5 INJECTION INTRAMUSCULAR; INTRAVENOUS at 17:32

## 2024-10-06 RX ADMIN — POTASSIUM CHLORIDE 10 MEQ: 7.46 INJECTION, SOLUTION INTRAVENOUS at 02:06

## 2024-10-06 RX ADMIN — SODIUM CHLORIDE, PRESERVATIVE FREE 10 ML: 5 INJECTION INTRAVENOUS at 07:46

## 2024-10-06 RX ADMIN — GUAIFENESIN AND DEXTROMETHORPHAN 5 ML: 100; 10 SYRUP ORAL at 10:38

## 2024-10-06 RX ADMIN — MORPHINE SULFATE 2 MG: 2 INJECTION, SOLUTION INTRAMUSCULAR; INTRAVENOUS at 01:40

## 2024-10-06 RX ADMIN — POTASSIUM CHLORIDE 10 MEQ: 7.46 INJECTION, SOLUTION INTRAVENOUS at 04:24

## 2024-10-06 RX ADMIN — AMPICILLIN SODIUM AND SULBACTAM SODIUM 3000 MG: 2; 1 INJECTION, POWDER, FOR SOLUTION INTRAMUSCULAR; INTRAVENOUS at 15:41

## 2024-10-06 RX ADMIN — METOCLOPRAMIDE HYDROCHLORIDE 5 MG: 5 INJECTION INTRAMUSCULAR; INTRAVENOUS at 15:40

## 2024-10-06 RX ADMIN — ONDANSETRON 4 MG: 2 INJECTION INTRAMUSCULAR; INTRAVENOUS at 12:22

## 2024-10-06 RX ADMIN — SODIUM CHLORIDE: 9 INJECTION, SOLUTION INTRAVENOUS at 21:32

## 2024-10-06 RX ADMIN — POTASSIUM CHLORIDE 10 MEQ: 7.46 INJECTION, SOLUTION INTRAVENOUS at 03:08

## 2024-10-06 RX ADMIN — AMPICILLIN SODIUM AND SULBACTAM SODIUM 3000 MG: 2; 1 INJECTION, POWDER, FOR SOLUTION INTRAMUSCULAR; INTRAVENOUS at 01:39

## 2024-10-06 RX ADMIN — Medication 1 LOZENGE: at 20:35

## 2024-10-06 ASSESSMENT — PAIN - FUNCTIONAL ASSESSMENT
PAIN_FUNCTIONAL_ASSESSMENT: ACTIVITIES ARE NOT PREVENTED
PAIN_FUNCTIONAL_ASSESSMENT: ACTIVITIES ARE NOT PREVENTED

## 2024-10-06 ASSESSMENT — PAIN SCALES - GENERAL
PAINLEVEL_OUTOF10: 6
PAINLEVEL_OUTOF10: 0

## 2024-10-06 ASSESSMENT — PAIN DESCRIPTION - ORIENTATION
ORIENTATION: INNER;MID
ORIENTATION: MID;INNER
ORIENTATION: MID;INNER

## 2024-10-06 ASSESSMENT — PAIN DESCRIPTION - LOCATION
LOCATION: THROAT
LOCATION: THROAT
LOCATION: CHEST
LOCATION: THROAT

## 2024-10-06 ASSESSMENT — PAIN DESCRIPTION - DESCRIPTORS
DESCRIPTORS: ACHING;DISCOMFORT
DESCRIPTORS: ACHING;DISCOMFORT
DESCRIPTORS: ACHING

## 2024-10-06 NOTE — PROGRESS NOTES
Nocturnist ordered for promethazine- dextromethorphan (promethazine -DM)6.25-15 mg /5 ml syrup  every 4 hrs PRN for 3 days.

## 2024-10-06 NOTE — PROGRESS NOTES
Patient is alert and oriented X4, looks lethargic. Promethazine -DM given round the clock PRN, coughing episodes have decreased. Vomitted 2-3 times over the night (250 mls) brown-reddish color.   Potassium replaced X1.  Nebulization given X1          Infusin mls/hr NSS    IV accessess remain intact and patent.  Able to use urinal, voiding good urine output.  Afebrile  Low sinus tachy, slightly elevated BP.  Bed in loqwest position, call light within reach.

## 2024-10-06 NOTE — PROGRESS NOTES
Patient is desating as low as 85% while lying on bed comfortably, with closed eyes. With 6 LPM HFNC. Notified RT, and requested to give the patient the nebulization.

## 2024-10-06 NOTE — PROGRESS NOTES
Care rounds completed with Dr. No and multidisciplinary team. Reviewed labs, meds, VS, assessment, & plan of care for today. See dictated note and new orders for details.     -add labatelol for BP control   -keep in ICU

## 2024-10-06 NOTE — PROGRESS NOTES
Shift assessment completed (see flowsheet).  Patient is alert and oriented X4 , with mild lethargy. With terrible cough and throat pain. On 6 LPM HFNC. Low sinus tachy and mildly elevated BP. Afebrile.  Bed in lowest position, call light within reach.    Infusing:  KVO  IV abx  NSS 75 mls/hr      IV access:  PIV right  AC  PIV left AC    Able to use urinal.

## 2024-10-06 NOTE — PROGRESS NOTES
[facility]                                 Pulmonary/Critical care  Consult /Progress Note :                                                                  CC :DKA  Patient is being seen at the request of Dr STANFORD  for a consultation for DKA     Subjective   Still nauseated  Out of DKA  Using his pimp  Not eating  On NS 75   Lipase N       PHYSICAL EXAM:  Vitals:    10/06/24 0800   BP: (!) 168/92   Pulse: (!) 117   Resp:    Temp:    SpO2: 97%     Gen: No distress.   Eyes: PERRL. No sclera icterus. No conjunctival injection.   ENT: No discharge. Pharynx clear.   Neck: Trachea midline. No obvious mass.    Resp: No accessory muscle use. No crackles. No wheezes. No rhonchi. No dullness on percussion.  CV: Regular rate. Regular rhythm. No murmur or rub. No edema. Peripheral pulses are 2+.  Capillary refill is less than 3 seconds.  GI: Non-tender. Non-distended. No hernia.   Skin: Warm and dry. No nodule on exposed extremities.   Lymph: No cervical LAD. No supraclavicular LAD.   M/S: No cyanosis. No joint deformity. No clubbing.   Neuro: Awake. Alert. Moves all four extremities.   Psych: Oriented x 3. No anxiety.     LABS:  CBC:   Recent Labs     10/03/24  2213 10/05/24  0500 10/06/24  0614   WBC 16.0* 12.3* 10.8   HGB 14.5 12.3* 12.7*   HCT 43.6 34.9* 36.2*   MCV 92.7 90.5 90.6    221 202     BMP:   Recent Labs     10/04/24  0735 10/04/24  1104 10/04/24  2323 10/05/24  0500 10/05/24  1126 10/05/24  2344 10/06/24  0614    142   < > 142 145 145 147*   K 3.3* 3.4*   < > 3.9 3.7 3.4* 3.6    108   < > 108 110 108 108   CO2 23 25   < > 22 23 26 27   PHOS 0.7* 1.2*  --  2.3*  --   --   --    BUN 41* 43*   < > 34* 28* 21* 19   CREATININE 2.8* 2.8*   < > 2.0* 2.0* 1.7* 1.5*    < > = values in this interval not displayed.     LIVER PROFILE:   Recent Labs     10/03/24  2213   AST 20   ALT 13   LIPASE 17.0   BILITOT 1.2*

## 2024-10-06 NOTE — PROGRESS NOTES
IM Progress Note    Admit Date:  10/3/2024  2    Interval history:  DKA , hx of TYpe 1 DM with CKD     Improved sugars and resolved anion gap , transitioned to home insulin pump  Nausea remains, developed cough and hypoxia overnight with new pna       Subjective:  Mr. Doe seen up in bed, still feels nauseated, vomited x 2   Unable to keep anything down  Minimal cough     No fevers  Now on 4 L o2       Objective:   BP (!) 148/88   Pulse (!) 106   Temp 97.8 °F (36.6 °C) (Oral)   Resp 29   Ht 1.676 m (5' 6\")   Wt 81 kg (178 lb 9.2 oz)   SpO2 95%   BMI 28.82 kg/m²     Intake/Output Summary (Last 24 hours) at 10/6/2024 0747  Last data filed at 10/6/2024 0400  Gross per 24 hour   Intake 5 ml   Output 3850 ml   Net -3845 ml       Physical Exam:      General:  middle aged male ill appearing- fatigued  Awake, alert and oriented. Appears to be not in any distress  Mucous Membranes:  Pink , anicteric  Neck: No JVD, no carotid bruit, no thyromegaly  Chest: improving shabbir air entry with scattered rhonchi on left base   Cardiovascular:  RRR S1S2 heard, no murmurs or gallops  Abdomen:  Soft, undistended, mid epigastric tenderness  , no organomegaly, BS present  Extremities: No edema or cyanosis. Distal pulses well felt  Neurological : grossly normal non focal with fatigue    Medications:   Scheduled Medications:    ampicillin-sulbactam  3,000 mg IntraVENous Q6H    pantoprazole (PROTONIX) 40 mg in sodium chloride (PF) 0.9 % 10 mL injection  40 mg IntraVENous Daily    sodium chloride flush  5-40 mL IntraVENous 2 times per day    enoxaparin  40 mg SubCUTAneous Daily    mupirocin   Each Nostril BID    Insulin Pump - Bolus Dose   SubCUTAneous 4x Daily AC & HS    Insulin Pump - Basal Dose   SubCUTAneous Daily     I   sodium chloride 5 mL/hr at 10/04/24 1536    dextrose 5% and 0.45% NaCl with KCl 20 mEq Stopped (10/04/24 1518)    sodium chloride 75 mL/hr at 10/05/24 0157    dextrose      dextrose    No evidence of bowel obstruction         XR CHEST PORTABLE   Final Result   There is no evidence of acute chest disease.           Hemoglobin A1C   Date Value Ref Range Status   10/04/2024 7.7 See comment % Final     Comment:     Comment:  Diagnosis of Diabetes: > or = 6.5%  Increased risk of diabetes (Prediabetes): 5.7-6.4%  Glycemic Control: Nonpregnant Adults: <7.0%                    Pregnant: <6.0%               Assessment & Plan:      DKA with hx of DM 1    On insulin pump and f/w Endocrine at [facility] and not had DKA for a long time, etiology unclear     Sugars around 900 on arrival with severe aniongap acidosis and ARF      Started  patient on IV insulin drip per DKA protocol.    Admitted to  ICU for critical care management.   Aggressive IV fluid resuscitation given per protocol.    Monitored and replaced electrolytes per protocol.  Improved  acidosis, transitioned to home insulin pump and doing well     N/v - sec to above. Did not improve with zofran or reglan  Start compazine   AXr with no obstruction   No diarrhea since arrival   Added PPI, consider GI consult    CKD 3 b with ARF - sec to above  Given IVFgiven improved to baseline      Leucocytosis - sec to above  Cxr neg on arrival - improved    Acute hypoxemic resp failure, suspect aspiration pneumonia with recurrent emesis  - likely CAP , left pna on imaging  - on unasyn D2   -, add IBD, o2 support, wean as able       PPI resume diet  Lovenox  Full code      Parish Grier MD, 10/6/2024 7:47 AM

## 2024-10-06 NOTE — CONSULTS
Consultation Note    Patient Name: Russell Doe  : 1980  Age: 44 y.o.     Admitting Physician: Leighton Thompson*   Date of Admission: 10/3/2024  9:58 PM   Primary Care Physician: Thomas Ríos MD        Russell Doe is being seen at the request of Leighton Thompson* for nausea vomiting..    History of Present Illness:  44-year-old male presented to the emergency room complaining of nausea vomiting and had a significant elevated blood sugar.  He was diagnosed with DKA.  Patient was admitted to the ICU.  Patient complained of persistent nausea and vomiting.  X-ray of the abdomen was unremarkable.  On admission, hepatic profile revealed mild elevation of the alkaline phosphatase.  Total bilirubin at 1.2.  He had a normal lipase.  White count 10.8.  Hemoglobin 12.7.  Hematocrit 36.2.  Platelet 202.  Due to persistent symptoms of nausea vomiting, GI consult was requested.  Patient states that he had similar symptoms more than 10 years ago.  He was diagnosed with gastroparesis.  He admits to lower abdominal discomfort.  The symptoms are similar to the discomfort he experienced years ago.  He denies hematemesis or coffee-ground emesis.  Although he admits to vomiting dark content.  Patient denies noticing blood in stools or black tarry stools.  Patient denies taking anti-inflammatory medications.  Patient has no known history of ulcer disease.    GI History:  Upper endoscopy by Dr. Hernandez on 2014  Normal esophagus.  Normal duodenum.  Bezoar in the stomach body and fundus.     Past Medical History:  Past Medical History:   Diagnosis Date    Diabetes mellitus (HCC)     Gastroparesis     Hypertension     Kidney disease         Past Surgical History:  Past Surgical History:   Procedure Laterality Date    EYE SURGERY Bilateral 2004    HIP SURGERY Right 2020    hip labrum    PENIS SURGERY      per wife, implant    RETINAL DETACHMENT SURGERY Left 2017        Historical

## 2024-10-06 NOTE — PROGRESS NOTES
Called [facility] for consult spoke to kyree Electronically signed by Sarah Thayer on 10/6/2024 at 11:57 AM

## 2024-10-06 NOTE — PROGRESS NOTES
4 Eyes Skin Assessment     NAME:  Russell Doe  YOB: 1980  MEDICAL RECORD NUMBER:  [id number]    The patient is being assessed for  Other shift    I agree that at least one RN has performed a thorough Head to Toe Skin Assessment on the patient. ALL assessment sites listed below have been assessed.      Areas assessed by both nurses:    Head, Face, Ears, Shoulders, Back, Chest, Arms, Elbows, Hands, Sacrum. Buttock, Coccyx, Ischium, Legs. Feet and Heels, and Under Medical Devices         Does the Patient have a Wound? No noted wound(s)       Seth Prevention initiated by RN: Yes  Wound Care Orders initiated by RN: Yes    Pressure Injury (Stage 3,4, Unstageable, DTI, NWPT, and Complex wounds) if present, place Wound referral order by RN under : No    New Ostomies, if present place, Ostomy referral order under : No     Nurse 1 eSignature: Electronically signed by Elizabeth Grimaldo RN on 10/5/24 at 9:42 PM EDT    **SHARE this note so that the co-signing nurse can place an eSignature**    Nurse 2 eSignature: Electronically signed by Kraig Collier RN on 10/6/24 at 6:32 AM EDT

## 2024-10-06 NOTE — PROGRESS NOTES
AM assessment complete, see flowsheet. Medications given per MAR. Pt A&O x4, awake and nauseous this morning. PRNs given. VSS. Remains on 6L HFNC. IV lines and monitors checked and tightened. Awaiting MD rounds.

## 2024-10-07 PROBLEM — J18.9 PNEUMONIA DUE TO INFECTIOUS ORGANISM: Status: ACTIVE | Noted: 2024-10-07

## 2024-10-07 PROBLEM — R11.0 NAUSEA: Status: ACTIVE | Noted: 2024-10-07

## 2024-10-07 LAB
ANION GAP SERPL CALCULATED.3IONS-SCNC: 10 MMOL/L (ref 3–16)
ANION GAP SERPL CALCULATED.3IONS-SCNC: 12 MMOL/L (ref 3–16)
ANION GAP SERPL CALCULATED.3IONS-SCNC: 12 MMOL/L (ref 3–16)
BASOPHILS # BLD: 0 K/UL (ref 0–0.2)
BASOPHILS NFR BLD: 0.3 %
BUN SERPL-MCNC: 18 MG/DL (ref 7–20)
BUN SERPL-MCNC: 19 MG/DL (ref 7–20)
BUN SERPL-MCNC: 20 MG/DL (ref 7–20)
CALCIUM SERPL-MCNC: 8 MG/DL (ref 8.3–10.6)
CALCIUM SERPL-MCNC: 8.2 MG/DL (ref 8.3–10.6)
CALCIUM SERPL-MCNC: 8.4 MG/DL (ref 8.3–10.6)
CHLORIDE SERPL-SCNC: 107 MMOL/L (ref 99–110)
CHLORIDE SERPL-SCNC: 107 MMOL/L (ref 99–110)
CHLORIDE SERPL-SCNC: 110 MMOL/L (ref 99–110)
CO2 SERPL-SCNC: 25 MMOL/L (ref 21–32)
CO2 SERPL-SCNC: 26 MMOL/L (ref 21–32)
CO2 SERPL-SCNC: 28 MMOL/L (ref 21–32)
CREAT SERPL-MCNC: 1.3 MG/DL (ref 0.9–1.3)
CREAT SERPL-MCNC: 1.4 MG/DL (ref 0.9–1.3)
CREAT SERPL-MCNC: 1.4 MG/DL (ref 0.9–1.3)
DEPRECATED RDW RBC AUTO: 12.8 % (ref 12.4–15.4)
EOSINOPHIL # BLD: 0 K/UL (ref 0–0.6)
EOSINOPHIL NFR BLD: 0.3 %
GFR SERPLBLD CREATININE-BSD FMLA CKD-EPI: 63 ML/MIN/{1.73_M2}
GFR SERPLBLD CREATININE-BSD FMLA CKD-EPI: 63 ML/MIN/{1.73_M2}
GFR SERPLBLD CREATININE-BSD FMLA CKD-EPI: 68 ML/MIN/{1.73_M2}
GLUCOSE BLD-MCNC: 119 MG/DL (ref 70–99)
GLUCOSE BLD-MCNC: 131 MG/DL (ref 70–99)
GLUCOSE BLD-MCNC: 133 MG/DL (ref 70–99)
GLUCOSE BLD-MCNC: 159 MG/DL (ref 70–99)
GLUCOSE SERPL-MCNC: 127 MG/DL (ref 70–99)
GLUCOSE SERPL-MCNC: 140 MG/DL (ref 70–99)
GLUCOSE SERPL-MCNC: 144 MG/DL (ref 70–99)
HCT VFR BLD AUTO: 35.2 % (ref 40.5–52.5)
HGB BLD-MCNC: 12.2 G/DL (ref 13.5–17.5)
LYMPHOCYTES # BLD: 0.8 K/UL (ref 1–5.1)
LYMPHOCYTES NFR BLD: 9.8 %
MAGNESIUM SERPL-MCNC: 1.7 MG/DL (ref 1.8–2.4)
MAGNESIUM SERPL-MCNC: 1.7 MG/DL (ref 1.8–2.4)
MAGNESIUM SERPL-MCNC: 1.9 MG/DL (ref 1.8–2.4)
MCH RBC QN AUTO: 31.4 PG (ref 26–34)
MCHC RBC AUTO-ENTMCNC: 34.8 G/DL (ref 31–36)
MCV RBC AUTO: 90.3 FL (ref 80–100)
MONOCYTES # BLD: 0.7 K/UL (ref 0–1.3)
MONOCYTES NFR BLD: 8.2 %
NEUTROPHILS # BLD: 7 K/UL (ref 1.7–7.7)
NEUTROPHILS NFR BLD: 81.4 %
PERFORMED ON: ABNORMAL
PLATELET # BLD AUTO: 211 K/UL (ref 135–450)
PMV BLD AUTO: 9.3 FL (ref 5–10.5)
POTASSIUM SERPL-SCNC: 3 MMOL/L (ref 3.5–5.1)
POTASSIUM SERPL-SCNC: 3.1 MMOL/L (ref 3.5–5.1)
POTASSIUM SERPL-SCNC: 3.2 MMOL/L (ref 3.5–5.1)
PROCALCITONIN SERPL IA-MCNC: 5.78 NG/ML (ref 0–0.15)
RBC # BLD AUTO: 3.89 M/UL (ref 4.2–5.9)
SODIUM SERPL-SCNC: 144 MMOL/L (ref 136–145)
SODIUM SERPL-SCNC: 145 MMOL/L (ref 136–145)
SODIUM SERPL-SCNC: 148 MMOL/L (ref 136–145)
WBC # BLD AUTO: 8.6 K/UL (ref 4–11)

## 2024-10-07 PROCEDURE — 6360000002 HC RX W HCPCS: Performed by: INTERNAL MEDICINE

## 2024-10-07 PROCEDURE — 99233 SBSQ HOSP IP/OBS HIGH 50: CPT | Performed by: INTERNAL MEDICINE

## 2024-10-07 PROCEDURE — 2060000000 HC ICU INTERMEDIATE R&B

## 2024-10-07 PROCEDURE — 99232 SBSQ HOSP IP/OBS MODERATE 35: CPT | Performed by: INTERNAL MEDICINE

## 2024-10-07 PROCEDURE — 2580000003 HC RX 258: Performed by: INTERNAL MEDICINE

## 2024-10-07 PROCEDURE — 2700000000 HC OXYGEN THERAPY PER DAY

## 2024-10-07 PROCEDURE — 2580000003 HC RX 258: Performed by: FAMILY MEDICINE

## 2024-10-07 PROCEDURE — 80048 BASIC METABOLIC PNL TOTAL CA: CPT

## 2024-10-07 PROCEDURE — 6370000000 HC RX 637 (ALT 250 FOR IP): Performed by: INTERNAL MEDICINE

## 2024-10-07 PROCEDURE — 83735 ASSAY OF MAGNESIUM: CPT

## 2024-10-07 PROCEDURE — 94761 N-INVAS EAR/PLS OXIMETRY MLT: CPT

## 2024-10-07 PROCEDURE — 36415 COLL VENOUS BLD VENIPUNCTURE: CPT

## 2024-10-07 PROCEDURE — 84145 PROCALCITONIN (PCT): CPT

## 2024-10-07 PROCEDURE — 6360000002 HC RX W HCPCS: Performed by: FAMILY MEDICINE

## 2024-10-07 PROCEDURE — 85025 COMPLETE CBC W/AUTO DIFF WBC: CPT

## 2024-10-07 RX ORDER — AMLODIPINE BESYLATE 5 MG/1
10 TABLET ORAL DAILY
Status: DISCONTINUED | OUTPATIENT
Start: 2024-10-07 | End: 2024-10-09 | Stop reason: HOSPADM

## 2024-10-07 RX ORDER — MAGNESIUM SULFATE IN WATER 40 MG/ML
2000 INJECTION, SOLUTION INTRAVENOUS ONCE
Status: COMPLETED | OUTPATIENT
Start: 2024-10-07 | End: 2024-10-07

## 2024-10-07 RX ORDER — LABETALOL HYDROCHLORIDE 5 MG/ML
10 INJECTION, SOLUTION INTRAVENOUS ONCE
Status: COMPLETED | OUTPATIENT
Start: 2024-10-07 | End: 2024-10-07

## 2024-10-07 RX ORDER — CALCIUM GLUCONATE 20 MG/ML
1000 INJECTION, SOLUTION INTRAVENOUS ONCE
Status: COMPLETED | OUTPATIENT
Start: 2024-10-07 | End: 2024-10-07

## 2024-10-07 RX ORDER — LOSARTAN POTASSIUM 25 MG/1
25 TABLET ORAL DAILY
Status: DISCONTINUED | OUTPATIENT
Start: 2024-10-07 | End: 2024-10-09 | Stop reason: HOSPADM

## 2024-10-07 RX ORDER — LABETALOL HYDROCHLORIDE 5 MG/ML
20 INJECTION, SOLUTION INTRAVENOUS EVERY 6 HOURS PRN
Status: DISCONTINUED | OUTPATIENT
Start: 2024-10-07 | End: 2024-10-09 | Stop reason: HOSPADM

## 2024-10-07 RX ADMIN — AMLODIPINE BESYLATE 10 MG: 5 TABLET ORAL at 10:41

## 2024-10-07 RX ADMIN — AMPICILLIN SODIUM AND SULBACTAM SODIUM 3000 MG: 2; 1 INJECTION, POWDER, FOR SOLUTION INTRAMUSCULAR; INTRAVENOUS at 02:20

## 2024-10-07 RX ADMIN — SODIUM CHLORIDE, PRESERVATIVE FREE 10 ML: 5 INJECTION INTRAVENOUS at 20:11

## 2024-10-07 RX ADMIN — PANTOPRAZOLE SODIUM 40 MG: 40 INJECTION, POWDER, FOR SOLUTION INTRAVENOUS at 07:37

## 2024-10-07 RX ADMIN — AMPICILLIN SODIUM AND SULBACTAM SODIUM 3000 MG: 2; 1 INJECTION, POWDER, FOR SOLUTION INTRAMUSCULAR; INTRAVENOUS at 07:39

## 2024-10-07 RX ADMIN — AMPICILLIN SODIUM AND SULBACTAM SODIUM 3000 MG: 2; 1 INJECTION, POWDER, FOR SOLUTION INTRAMUSCULAR; INTRAVENOUS at 13:51

## 2024-10-07 RX ADMIN — MORPHINE SULFATE 2 MG: 2 INJECTION, SOLUTION INTRAMUSCULAR; INTRAVENOUS at 04:00

## 2024-10-07 RX ADMIN — LABETALOL HYDROCHLORIDE 10 MG: 5 INJECTION, SOLUTION INTRAVENOUS at 02:55

## 2024-10-07 RX ADMIN — LOSARTAN POTASSIUM 25 MG: 25 TABLET, FILM COATED ORAL at 10:40

## 2024-10-07 RX ADMIN — METOCLOPRAMIDE HYDROCHLORIDE 5 MG: 5 INJECTION INTRAMUSCULAR; INTRAVENOUS at 20:11

## 2024-10-07 RX ADMIN — POTASSIUM CHLORIDE 10 MEQ: 7.46 INJECTION, SOLUTION INTRAVENOUS at 04:02

## 2024-10-07 RX ADMIN — MUPIROCIN: 20 OINTMENT TOPICAL at 20:11

## 2024-10-07 RX ADMIN — ENOXAPARIN SODIUM 40 MG: 100 INJECTION SUBCUTANEOUS at 07:37

## 2024-10-07 RX ADMIN — MORPHINE SULFATE 2 MG: 2 INJECTION, SOLUTION INTRAMUSCULAR; INTRAVENOUS at 13:49

## 2024-10-07 RX ADMIN — PROCHLORPERAZINE EDISYLATE 10 MG: 5 INJECTION INTRAMUSCULAR; INTRAVENOUS at 04:00

## 2024-10-07 RX ADMIN — LABETALOL HYDROCHLORIDE 20 MG: 5 INJECTION, SOLUTION INTRAVENOUS at 06:30

## 2024-10-07 RX ADMIN — CALCIUM GLUCONATE 1000 MG: 20 INJECTION, SOLUTION INTRAVENOUS at 03:01

## 2024-10-07 RX ADMIN — POTASSIUM CHLORIDE 10 MEQ: 7.46 INJECTION, SOLUTION INTRAVENOUS at 02:59

## 2024-10-07 RX ADMIN — ONDANSETRON 4 MG: 2 INJECTION INTRAMUSCULAR; INTRAVENOUS at 23:46

## 2024-10-07 RX ADMIN — MAGNESIUM SULFATE HEPTAHYDRATE 2000 MG: 40 INJECTION, SOLUTION INTRAVENOUS at 10:45

## 2024-10-07 RX ADMIN — METOCLOPRAMIDE HYDROCHLORIDE 5 MG: 5 INJECTION INTRAMUSCULAR; INTRAVENOUS at 07:37

## 2024-10-07 RX ADMIN — SODIUM CHLORIDE, PRESERVATIVE FREE 10 ML: 5 INJECTION INTRAVENOUS at 07:37

## 2024-10-07 RX ADMIN — METOCLOPRAMIDE HYDROCHLORIDE 5 MG: 5 INJECTION INTRAMUSCULAR; INTRAVENOUS at 13:49

## 2024-10-07 RX ADMIN — SODIUM CHLORIDE: 9 INJECTION, SOLUTION INTRAVENOUS at 07:03

## 2024-10-07 RX ADMIN — LABETALOL HYDROCHLORIDE 10 MG: 5 INJECTION, SOLUTION INTRAVENOUS at 00:24

## 2024-10-07 RX ADMIN — METOCLOPRAMIDE HYDROCHLORIDE 5 MG: 5 INJECTION INTRAMUSCULAR; INTRAVENOUS at 02:20

## 2024-10-07 RX ADMIN — POTASSIUM CHLORIDE 10 MEQ: 7.46 INJECTION, SOLUTION INTRAVENOUS at 01:45

## 2024-10-07 RX ADMIN — POTASSIUM CHLORIDE 10 MEQ: 7.46 INJECTION, SOLUTION INTRAVENOUS at 05:21

## 2024-10-07 RX ADMIN — AMPICILLIN SODIUM AND SULBACTAM SODIUM 3000 MG: 2; 1 INJECTION, POWDER, FOR SOLUTION INTRAMUSCULAR; INTRAVENOUS at 20:12

## 2024-10-07 RX ADMIN — MUPIROCIN: 20 OINTMENT TOPICAL at 07:37

## 2024-10-07 ASSESSMENT — PAIN DESCRIPTION - LOCATION
LOCATION: THROAT
LOCATION: CHEST

## 2024-10-07 ASSESSMENT — PAIN SCALES - GENERAL
PAINLEVEL_OUTOF10: 6
PAINLEVEL_OUTOF10: 0
PAINLEVEL_OUTOF10: 0
PAINLEVEL_OUTOF10: 6

## 2024-10-07 ASSESSMENT — PAIN - FUNCTIONAL ASSESSMENT: PAIN_FUNCTIONAL_ASSESSMENT: ACTIVITIES ARE NOT PREVENTED

## 2024-10-07 ASSESSMENT — PAIN DESCRIPTION - DESCRIPTORS: DESCRIPTORS: ACHING;DISCOMFORT

## 2024-10-07 NOTE — PROGRESS NOTES
Care rounds completed with Dr. No and multidisciplinary team. Reviewed labs, meds, VS, assessment, & plan of care for today. See dictated note and new orders for details.     -restart home BP meds  -replace Mag  -stop IV fluids

## 2024-10-07 NOTE — PROGRESS NOTES
4 Eyes Skin Assessment     NAME:  Russell Doe  YOB: 1980  MEDICAL RECORD NUMBER:  [id number]    The patient is being assessed for  Other shift    I agree that at least one RN has performed a thorough Head to Toe Skin Assessment on the patient. ALL assessment sites listed below have been assessed.      Areas assessed by both nurses:    Head, Face, Ears, Shoulders, Back, Chest, Arms, Elbows, Hands, Sacrum. Buttock, Coccyx, Ischium, Legs. Feet and Heels, and Under Medical Devices         Does the Patient have a Wound? No noted wound(s)       Seth Prevention initiated by RN: Yes  Wound Care Orders initiated by RN: No    Pressure Injury (Stage 3,4, Unstageable, DTI, NWPT, and Complex wounds) if present, place Wound referral order by RN under : No    New Ostomies, if present place, Ostomy referral order under : No     Nurse 1 eSignature: Electronically signed by Elizabeth Grimaldo RN on 10/6/24 at 9:17 PM EDT    **SHARE this note so that the co-signing nurse can place an eSignature**    Nurse 2 eSignature: Electronically signed by Kraig Collier RN on 10/7/24 at 6:51 AM EDT

## 2024-10-07 NOTE — CARE COORDINATION
Spoke with Jhonatan KRAUS for update.  Patient transferring to the floor.   Needs complete GI work up. Should be admitted for a couple of more days.    Patient is on 1L of oxygen now. Does not have home 02.

## 2024-10-07 NOTE — PROGRESS NOTES
AM assessment complete, see flowsheet. Medications given per MAR. Pt A&O x4, in bed resting eyes closed. Minimal appetite this morning, able to drink water and few bites of applesauce. Less n/v overnight. BP remains elevated. Will attempt to add PO home medications today if patient can tolerate diet.     No other needs noted, wife at the bedside. Awaiting MD rounds.

## 2024-10-07 NOTE — PROGRESS NOTES
Patient is alert and oriented X4. Labatalol given X2  On 4 LPM HFNC.IV accessess remain intact. BMP sample awaiting to be drawn after the electrolytres replacement.  Minimal coughing episodes but still nauseated.    Afebrile. Bed in lowest position, call light within reach.

## 2024-10-07 NOTE — PROGRESS NOTES
metoclopramide (REGLAN) injection 5 mg  5 mg IntraVENous Q6H GhastineJeremy MD   5 mg at 10/07/24 1349    guaiFENesin-dextromethorphan (ROBITUSSIN DM) 100-10 MG/5ML syrup 5 mL  5 mL Oral Q4H PRN Harlan Wick MD   5 mL at 10/06/24 2035    Benzocaine-Menthol (CEPACOL) 1 lozenge  1 lozenge Oral Q2H PRN Harlan Wick MD   1 lozenge at 10/06/24 2035    albuterol (PROVENTIL) (2.5 MG/3ML) 0.083% nebulizer solution 2.5 mg  2.5 mg Nebulization Q4H PRN Kole No MD   2.5 mg at 10/06/24 0236    ampicillin-sulbactam (UNASYN) 3,000 mg in sodium chloride 0.9 % 100 mL IVPB (mini-bag)  3,000 mg IntraVENous Q6H Parish Grier MD   Stopped at 10/07/24 1440    benzonatate (TESSALON) capsule 100 mg  100 mg Oral TID PRN Parish Grier MD   100 mg at 10/05/24 2012    pantoprazole (PROTONIX) 40 mg in sodium chloride (PF) 0.9 % 10 mL injection  40 mg IntraVENous Daily Parish Grier MD   40 mg at 10/07/24 0737    albuterol sulfate HFA (PROVENTIL;VENTOLIN;PROAIR) 108 (90 Base) MCG/ACT inhaler 2 puff  2 puff Inhalation Q6H PRN Parish Grier MD        promethazine-dextromethorphan (PROMETHAZINE-DM) 6.25-15 MG/5ML syrup 5 mL  5 mL Oral Q4H PRN Harlan Wick MD   5 mL at 10/06/24 2131    sodium chloride flush 0.9 % injection 5-40 mL  5-40 mL IntraVENous 2 times per day Harlan Wick MD   10 mL at 10/07/24 0737    sodium chloride flush 0.9 % injection 5-40 mL  5-40 mL IntraVENous PRN Harlan Wick MD        0.9 % sodium chloride infusion   IntraVENous PRN Harlan Wick MD 5 mL/hr at 10/04/24 1536 Rate Verify at 10/04/24 1536    ondansetron (ZOFRAN-ODT) disintegrating tablet 4 mg  4 mg Oral Q8H PRN Harlan Wick MD        Or    ondansetron (ZOFRAN) injection 4 mg  4 mg IntraVENous Q6H PRN Harlan Wick MD   4 mg at 10/06/24 1222    polyethylene glycol (GLYCOLAX) packet 17 g  17 g Oral Daily PRN Harlan Wick MD        acetaminophen (TYLENOL)  tablet 650 mg  650 mg Oral Q6H PRN Harlan Wick MD        Or    acetaminophen (TYLENOL) suppository 650 mg  650 mg Rectal Q6H PRN Harlan Wick MD        potassium chloride 10 mEq/100 mL IVPB (Peripheral Line)  10 mEq IntraVENous PRN Harlan Wick  mL/hr at 10/07/24 0521 10 mEq at 10/07/24 0521    enoxaparin (LOVENOX) injection 40 mg  40 mg SubCUTAneous Daily Harlan Wick MD   40 mg at 10/07/24 0737    mupirocin (BACTROBAN) 2 % ointment   Each Nostril BID Elizabeth Daily DO   Given at 10/07/24 0737    dextrose bolus 10% 125 mL  125 mL IntraVENous PRN Parish Grier MD        Or    dextrose bolus 10% 250 mL  250 mL IntraVENous PRN Parish Grier MD        glucagon injection 1 mg  1 mg SubCUTAneous PRN Parish Grier MD        dextrose 10 % infusion   IntraVENous Continuous PRN Parish Grier MD        morphine (PF) injection 2 mg  2 mg IntraVENous Q4H PRN Parish Grier MD   2 mg at 10/07/24 1349    Insulin Pump - Bolus Dose    SubCUTAneous 4x Daily AC & HS Parish Grier MD        glucose chewable tablet 16 g  4 tablet Oral PRN Parish Grier MD        Insulin Pump - Basal Dose  (Patient Supplied)   SubCUTAneous Daily Parish Grier MD   1.4 Units at 10/06/24 0745         Recent Imaging:   XR CHEST PORTABLE  Narrative: EXAMINATION:  ONE XRAY VIEW OF THE CHEST    10/5/2024 6:27 am    COMPARISON:  3 October 2024    HISTORY:  ORDERING SYSTEM PROVIDED HISTORY: chest tightness  TECHNOLOGIST PROVIDED HISTORY:  Reason for exam:->chest tightness  Reason for Exam: chest tightness    FINDINGS:  Hypoinflated, rotated.  Patchy airspace opacity throughout the left mid and  lower lung zone.  The heart is borderline enlarged with slight increased  vascularity.  No obvious pneumothorax.  Osseous structures are unremarkable..  Impression: Airspace opacity in the left mid and lower lung zone consistent with  infiltrate.  Consider CT scan to

## 2024-10-07 NOTE — PROGRESS NOTES
guaiFENesin-dextromethorphan, Benzocaine-Menthol, albuterol, benzonatate, albuterol sulfate HFA, promethazine-dextromethorphan, sodium chloride flush, sodium chloride, ondansetron **OR** ondansetron, polyethylene glycol, acetaminophen **OR** acetaminophen, potassium chloride, magnesium sulfate, sodium phosphate 15 mmol in sodium chloride 0.9 % 250 mL IVPB, dextrose 5% and 0.45% NaCl with KCl 20 mEq, glucose, dextrose bolus **OR** dextrose bolus, glucagon (rDNA), dextrose, morphine, glucose, dextrose bolus **OR** dextrose bolus, dextrose    Lab Data:  Recent Labs     10/05/24  0500 10/06/24  0614 10/07/24  0454   WBC 12.3* 10.8 8.6   HGB 12.3* 12.7* 12.2*   HCT 34.9* 36.2* 35.2*   MCV 90.5 90.6 90.3    202 211     Recent Labs     10/04/24  1104 10/04/24  2323 10/05/24  0500 10/05/24  1126 10/06/24  2338 10/07/24  0454 10/07/24  0730      < > 142   < > 144 148* 145   K 3.4*   < > 3.9   < > 3.3* 3.2* 3.1*      < > 108   < > 107 110 107   CO2 25   < > 22   < > 27 26 28   PHOS 1.2*  --  2.3*  --   --   --   --    BUN 43*   < > 34*   < > 20 20 19   CREATININE 2.8*   < > 2.0*   < > 1.5* 1.3 1.4*    < > = values in this interval not displayed.     No results for input(s): \"CKTOTAL\", \"CKMB\", \"CKMBINDEX\", \"TROPONINI\" in the last 72 hours.    Coagulation:   Lab Results   Component Value Date/Time    INR 0.94 10/03/2024 10:13 PM     Cardiac markers: No results found for: \"CKMB\", \"CKTOTAL\", \"TROPONINI\", \"MYOGLOBIN\"      Lab Results   Component Value Date    ALT 13 10/03/2024    AST 20 10/03/2024    ALKPHOS 138 (H) 10/03/2024    BILITOT 1.2 (H) 10/03/2024       Lab Results   Component Value Date    INR 0.94 10/03/2024    PROTIME 12.8 10/03/2024       Radiology      XR CHEST PORTABLE   Final Result   Airspace opacity in the left mid and lower lung zone consistent with   infiltrate.  Consider CT scan to further characterize.         XR ABDOMEN (KUB) (SINGLE AP VIEW)   Final Result   No acute findings.  No  evidence of bowel obstruction         XR CHEST PORTABLE   Final Result   There is no evidence of acute chest disease.           Hemoglobin A1C   Date Value Ref Range Status   10/04/2024 7.7 See comment % Final     Comment:     Comment:  Diagnosis of Diabetes: > or = 6.5%  Increased risk of diabetes (Prediabetes): 5.7-6.4%  Glycemic Control: Nonpregnant Adults: <7.0%                    Pregnant: <6.0%               Assessment & Plan:      DKA with hx of DM 1    On insulin pump and f/w Endocrine at [facility] and not had DKA for a long time, etiology unclear     Sugars around 900 on arrival with severe aniongap acidosis and ARF      Started  patient on IV insulin drip per DKA protocol.    Admitted to  ICU for critical care management.   Aggressive IV fluid resuscitation given per protocol.    Monitored and replaced electrolytes per protocol.  Improved  acidosis, transitioned to home insulin pump and doing well     N/v - sec to above. Did not improve with zofran or reglan  Start compazine   AXr with no obstruction   No diarrhea since arrival   Added PPI, consider GI consult    CKD 3 b with ARF - sec to above  Given IVFgiven improved to baseline      Acute hypoxemic resp failure, suspect aspiration pneumonia with recurrent emesis  - likely CAP , left pna on imaging  - on unasyn D2   - add IBD, o2 support, wean as able       PPI resume diet  Lovenox  Full code    Transfer to [facility].      ODALIS ABRAHAM MD, 10/7/2024 7:48 AM

## 2024-10-07 NOTE — PROGRESS NOTES
Shift assessment completed (see flowsheet). Patient is alert and oriented X4. On 4 PM HFNC. With intermittent episodes of coughing.      Infusing:  IV abx    IV access:  PIV right AC  PIV left AC    Able to use urinal.      Afebrile,bed in lowest position,call light within reach.

## 2024-10-07 NOTE — PROGRESS NOTES
Nocturnist was notified that the target BP of 160/90 is not being achieved after the labetalol PRN was given. \"They can adjust it during the day if they are already managing it ''.

## 2024-10-08 ENCOUNTER — APPOINTMENT (OUTPATIENT)
Dept: GENERAL RADIOLOGY | Age: 44
DRG: 637 | End: 2024-10-08
Payer: COMMERCIAL

## 2024-10-08 LAB
ANION GAP SERPL CALCULATED.3IONS-SCNC: 12 MMOL/L (ref 3–16)
BASOPHILS # BLD: 0 K/UL (ref 0–0.2)
BASOPHILS NFR BLD: 0.3 %
BUN SERPL-MCNC: 20 MG/DL (ref 7–20)
CALCIUM SERPL-MCNC: 8.3 MG/DL (ref 8.3–10.6)
CHLORIDE SERPL-SCNC: 105 MMOL/L (ref 99–110)
CO2 SERPL-SCNC: 24 MMOL/L (ref 21–32)
CREAT SERPL-MCNC: 1.4 MG/DL (ref 0.9–1.3)
DEPRECATED RDW RBC AUTO: 12.7 % (ref 12.4–15.4)
EOSINOPHIL # BLD: 0.2 K/UL (ref 0–0.6)
EOSINOPHIL NFR BLD: 1.9 %
GFR SERPLBLD CREATININE-BSD FMLA CKD-EPI: 63 ML/MIN/{1.73_M2}
GLUCOSE BLD-MCNC: 112 MG/DL (ref 70–99)
GLUCOSE BLD-MCNC: 86 MG/DL (ref 70–99)
GLUCOSE BLD-MCNC: 88 MG/DL (ref 70–99)
GLUCOSE BLD-MCNC: 93 MG/DL (ref 70–99)
GLUCOSE BLD-MCNC: 98 MG/DL (ref 70–99)
GLUCOSE SERPL-MCNC: 105 MG/DL (ref 70–99)
HCT VFR BLD AUTO: 36.9 % (ref 40.5–52.5)
HGB BLD-MCNC: 13.1 G/DL (ref 13.5–17.5)
LYMPHOCYTES # BLD: 1.4 K/UL (ref 1–5.1)
LYMPHOCYTES NFR BLD: 12.9 %
MAGNESIUM SERPL-MCNC: 2.5 MG/DL (ref 1.8–2.4)
MCH RBC QN AUTO: 31.5 PG (ref 26–34)
MCHC RBC AUTO-ENTMCNC: 35.5 G/DL (ref 31–36)
MCV RBC AUTO: 88.7 FL (ref 80–100)
MONOCYTES # BLD: 0.9 K/UL (ref 0–1.3)
MONOCYTES NFR BLD: 8.9 %
NEUTROPHILS # BLD: 8 K/UL (ref 1.7–7.7)
NEUTROPHILS NFR BLD: 76 %
PERFORMED ON: ABNORMAL
PERFORMED ON: NORMAL
PLATELET # BLD AUTO: 244 K/UL (ref 135–450)
PMV BLD AUTO: 9.2 FL (ref 5–10.5)
POTASSIUM SERPL-SCNC: 3.1 MMOL/L (ref 3.5–5.1)
RBC # BLD AUTO: 4.16 M/UL (ref 4.2–5.9)
REASON FOR REJECTION: NORMAL
REJECTED TEST: NORMAL
SODIUM SERPL-SCNC: 141 MMOL/L (ref 136–145)
WBC # BLD AUTO: 10.6 K/UL (ref 4–11)

## 2024-10-08 PROCEDURE — 6360000002 HC RX W HCPCS: Performed by: INTERNAL MEDICINE

## 2024-10-08 PROCEDURE — 36415 COLL VENOUS BLD VENIPUNCTURE: CPT

## 2024-10-08 PROCEDURE — 71045 X-RAY EXAM CHEST 1 VIEW: CPT

## 2024-10-08 PROCEDURE — 2580000003 HC RX 258: Performed by: INTERNAL MEDICINE

## 2024-10-08 PROCEDURE — 99232 SBSQ HOSP IP/OBS MODERATE 35: CPT | Performed by: INTERNAL MEDICINE

## 2024-10-08 PROCEDURE — 80048 BASIC METABOLIC PNL TOTAL CA: CPT

## 2024-10-08 PROCEDURE — 2700000000 HC OXYGEN THERAPY PER DAY

## 2024-10-08 PROCEDURE — 83735 ASSAY OF MAGNESIUM: CPT

## 2024-10-08 PROCEDURE — 94761 N-INVAS EAR/PLS OXIMETRY MLT: CPT

## 2024-10-08 PROCEDURE — 6360000002 HC RX W HCPCS: Performed by: FAMILY MEDICINE

## 2024-10-08 PROCEDURE — 99233 SBSQ HOSP IP/OBS HIGH 50: CPT | Performed by: INTERNAL MEDICINE

## 2024-10-08 PROCEDURE — 6370000000 HC RX 637 (ALT 250 FOR IP): Performed by: INTERNAL MEDICINE

## 2024-10-08 PROCEDURE — 85025 COMPLETE CBC W/AUTO DIFF WBC: CPT

## 2024-10-08 PROCEDURE — 1200000000 HC SEMI PRIVATE

## 2024-10-08 RX ORDER — PROMETHAZINE HYDROCHLORIDE 25 MG/1
25 TABLET ORAL EVERY 6 HOURS PRN
Status: DISCONTINUED | OUTPATIENT
Start: 2024-10-08 | End: 2024-10-09 | Stop reason: HOSPADM

## 2024-10-08 RX ADMIN — METOCLOPRAMIDE HYDROCHLORIDE 5 MG: 5 INJECTION INTRAMUSCULAR; INTRAVENOUS at 01:45

## 2024-10-08 RX ADMIN — METOCLOPRAMIDE HYDROCHLORIDE 5 MG: 5 INJECTION INTRAMUSCULAR; INTRAVENOUS at 14:10

## 2024-10-08 RX ADMIN — METOCLOPRAMIDE HYDROCHLORIDE 5 MG: 5 INJECTION INTRAMUSCULAR; INTRAVENOUS at 20:20

## 2024-10-08 RX ADMIN — AMLODIPINE BESYLATE 10 MG: 5 TABLET ORAL at 07:43

## 2024-10-08 RX ADMIN — POTASSIUM CHLORIDE 10 MEQ: 7.46 INJECTION, SOLUTION INTRAVENOUS at 08:23

## 2024-10-08 RX ADMIN — SODIUM CHLORIDE, PRESERVATIVE FREE 10 ML: 5 INJECTION INTRAVENOUS at 20:20

## 2024-10-08 RX ADMIN — PANTOPRAZOLE SODIUM 40 MG: 40 INJECTION, POWDER, FOR SOLUTION INTRAVENOUS at 07:43

## 2024-10-08 RX ADMIN — ONDANSETRON 4 MG: 2 INJECTION INTRAMUSCULAR; INTRAVENOUS at 11:56

## 2024-10-08 RX ADMIN — LOSARTAN POTASSIUM 25 MG: 25 TABLET, FILM COATED ORAL at 07:43

## 2024-10-08 RX ADMIN — METOCLOPRAMIDE HYDROCHLORIDE 5 MG: 5 INJECTION INTRAMUSCULAR; INTRAVENOUS at 07:43

## 2024-10-08 RX ADMIN — ONDANSETRON 4 MG: 2 INJECTION INTRAMUSCULAR; INTRAVENOUS at 05:55

## 2024-10-08 RX ADMIN — MORPHINE SULFATE 2 MG: 2 INJECTION, SOLUTION INTRAMUSCULAR; INTRAVENOUS at 03:42

## 2024-10-08 RX ADMIN — PROCHLORPERAZINE EDISYLATE 10 MG: 5 INJECTION INTRAMUSCULAR; INTRAVENOUS at 01:46

## 2024-10-08 RX ADMIN — AMPICILLIN SODIUM AND SULBACTAM SODIUM 3000 MG: 2; 1 INJECTION, POWDER, FOR SOLUTION INTRAMUSCULAR; INTRAVENOUS at 20:20

## 2024-10-08 RX ADMIN — POTASSIUM CHLORIDE 10 MEQ: 7.46 INJECTION, SOLUTION INTRAVENOUS at 07:19

## 2024-10-08 RX ADMIN — PROMETHAZINE HYDROCHLORIDE 25 MG: 25 TABLET ORAL at 14:13

## 2024-10-08 RX ADMIN — AMPICILLIN SODIUM AND SULBACTAM SODIUM 3000 MG: 2; 1 INJECTION, POWDER, FOR SOLUTION INTRAMUSCULAR; INTRAVENOUS at 07:46

## 2024-10-08 RX ADMIN — PROCHLORPERAZINE EDISYLATE 10 MG: 5 INJECTION INTRAMUSCULAR; INTRAVENOUS at 07:43

## 2024-10-08 RX ADMIN — MUPIROCIN: 20 OINTMENT TOPICAL at 07:44

## 2024-10-08 RX ADMIN — ENOXAPARIN SODIUM 40 MG: 100 INJECTION SUBCUTANEOUS at 07:44

## 2024-10-08 RX ADMIN — POTASSIUM CHLORIDE 10 MEQ: 7.46 INJECTION, SOLUTION INTRAVENOUS at 06:26

## 2024-10-08 RX ADMIN — AMPICILLIN SODIUM AND SULBACTAM SODIUM 3000 MG: 2; 1 INJECTION, POWDER, FOR SOLUTION INTRAMUSCULAR; INTRAVENOUS at 01:51

## 2024-10-08 RX ADMIN — POTASSIUM CHLORIDE 10 MEQ: 7.46 INJECTION, SOLUTION INTRAVENOUS at 09:24

## 2024-10-08 RX ADMIN — PROCHLORPERAZINE EDISYLATE 10 MG: 5 INJECTION INTRAMUSCULAR; INTRAVENOUS at 17:58

## 2024-10-08 RX ADMIN — AMPICILLIN SODIUM AND SULBACTAM SODIUM 3000 MG: 2; 1 INJECTION, POWDER, FOR SOLUTION INTRAMUSCULAR; INTRAVENOUS at 14:11

## 2024-10-08 ASSESSMENT — PAIN SCALES - GENERAL
PAINLEVEL_OUTOF10: 0
PAINLEVEL_OUTOF10: 0

## 2024-10-08 NOTE — PROGRESS NOTES
IM Progress Note    Admit Date:  10/3/2024  4    Interval history:  DKA , hx of TYpe 1 DM with CKD     Improved sugars and resolved anion gap , transitioned to home insulin pump  Nausea remains, developed cough and hypoxia overnight with new pna       Subjective:  Mr. Doe continues to feel poorly.  He vomited after eating a small amount of his breakfast this morning.  Has mild abdominal discomfort        Objective:   BP (!) 182/98   Pulse 92   Temp 98.9 °F (37.2 °C) (Temporal)   Resp 13   Ht 1.676 m (5' 6\")   Wt 76.6 kg (168 lb 14 oz)   SpO2 98%   BMI 27.26 kg/m²     Intake/Output Summary (Last 24 hours) at 10/8/2024 0808  Last data filed at 10/7/2024 2000  Gross per 24 hour   Intake 120 ml   Output 800 ml   Net -680 ml       Physical Exam:      General:  middle aged male ill appearing- fatigued  Awake, alert and oriented. Appears to be not in any distress  Mucous Membranes:  Pink , anicteric  Neck: No JVD, no carotid bruit, no thyromegaly  Chest: improving shabbir air entry with scattered rhonchi on left base   Cardiovascular:  RRR S1S2 heard, no murmurs or gallops  Abdomen:  Soft, undistended, mid epigastric tenderness  , no organomegaly, BS present  Extremities: No edema or cyanosis. Distal pulses well felt  Neurological : grossly normal non focal with fatigue    Medications:   Scheduled Medications:    amLODIPine  10 mg Oral Daily    losartan  25 mg Oral Daily    metoclopramide  5 mg IntraVENous Q6H    ampicillin-sulbactam  3,000 mg IntraVENous Q6H    pantoprazole (PROTONIX) 40 mg in sodium chloride (PF) 0.9 % 10 mL injection  40 mg IntraVENous Daily    sodium chloride flush  5-40 mL IntraVENous 2 times per day    enoxaparin  40 mg SubCUTAneous Daily    mupirocin   Each Nostril BID    Insulin Pump - Bolus Dose   SubCUTAneous 4x Daily AC & HS    Insulin Pump - Basal Dose   SubCUTAneous Daily     I   sodium chloride 5 mL/hr at 10/04/24 1536    dextrose       labetalol, prochlorperazine,

## 2024-10-08 NOTE — PROGRESS NOTES
Shift assessment complete- see flowsheets.  Pt is A&Ox4.  VSS  Respirations are easy, even and unlabored.  PIV WDL. Flushed at this time.  Still having nausea and vomiting. GI recommends giving nausea med and reglan 30 minutes before meals  Plan of care and goals reviewed. Call light within reach.  Bed in lowest position with wheels locked. No needs expressed at this time. Will continue to monitor.

## 2024-10-08 NOTE — PROGRESS NOTES
Progress Note    Patient Russell Doe  MRN: [id number]  YOB: 1980 Age: 44 y.o. Sex: male  Room: [address] [place] [address]       Admitting Physician: Harlan Wick MD   Date of Admission: 10/3/2024  9:58 PM   Primary Care Physician: Thomas Ríos MD     Subjective:  Russell Doe was seen and examined. We are following for nausea, hx gastroparesis .  -- did well overnight but got very nauseas with meal intake today     ROS:  Constitutional: Denies fever, no change in appetite  Respiratory: Denies cough or shortness of breath  Cardiovascular: Denies chest pain or edema    Objective:  Vital Signs:   Vitals:    10/08/24 1400   BP: (!) 165/90   Pulse: 95   Resp: 15   Temp:    SpO2: 95%         Physical Exam:  Constitutional: Alert and oriented x 4. No acute distress.   HEENT: Sclera anicteric, mucosal membranes moist  Cardiovascular: Regular rate and rhythm.  No murmurs.  Respiratory: Respirations nonlabored, no crepitus  GI: Abdomen nondistended, soft, and nontender.  Normal active bowel sounds.  No masses palpable.   Rectal: Deferred  Musculoskeletal:  No pitting edema of the lower legs.  Neurological: Gross memory appears intact.      Intake/Output:    Intake/Output Summary (Last 24 hours) at 10/8/2024 1406  Last data filed at 10/7/2024 2000  Gross per 24 hour   Intake 120 ml   Output 800 ml   Net -680 ml        Current Medications:  Current Facility-Administered Medications   Medication Dose Route Frequency Provider Last Rate Last Admin    promethazine (PHENERGAN) tablet 25 mg  25 mg Oral Q6H PRN Kole No MD        labetalol (NORMODYNE;TRANDATE) injection 20 mg  20 mg IntraVENous Q6H PRN Gerson Thompson MD   20 mg at 10/07/24 0630    amLODIPine (NORVASC) tablet 10 mg  10 mg Oral Daily Gerson Thompson MD   10 mg at 10/08/24 0743    losartan (COZAAR) tablet 25 mg  25 mg Oral Daily Gerson Thompson MD   25 mg at 10/08/24 0743    prochlorperazine (COMPAZINE)

## 2024-10-08 NOTE — FLOWSHEET NOTE
10/08/24 1754   Vital Signs   Temp 98.2 °F (36.8 °C)   Temp Source Oral   Pulse 98   Heart Rate Source Monitor   Respirations 18   BP (!) 162/95   MAP (Calculated) 117   BP Location Left upper arm   BP Method Automatic   Patient Position Semi fowlers   Pain Assessment   Pain Assessment None - Denies Pain   Pain Level 0   Opioid-Induced Sedation   POSS Score 1   Oxygen Therapy   SpO2 91 %   O2 Device None (Room air)     Patient is able to demonstrate the ability to move from a reclining position to an upright position within the recliner.    Bedside Mobility Assessment Tool (BMAT):     Assessment Level 1- Sit and Shake    1. From a semi-reclined position, ask patient to sit up and rotate to a seated position at the side of the bed. Can use the bedrail.    2. Ask patient to reach out and grab your hand and shake making sure patient reaches across his/her midline.   Pass- Patient is able to come to a seated position, maintain core strength. Maintains seated balance while reaching across midline. Move on to Assessment Level 2.     Assessment Level 2- Stretch and Point   1. With patient in seated position at the side of the bed, have patient place both feet on the floor (or stool) with knees no higher than hips.    2. Ask patient to stretch one leg and straighten the knee, then bend the ankle/flex and point the toes. If appropriate, repeat with the other leg.   Pass- Patient is able to demonstrate appropriate quad strength on intended weight bearing limb(s). Move onto Assessment Level 3.     Assessment Level 3- Stand   1. Ask patient to elevate off the bed or chair (seated to standing) using an assistive device (cane, bedrail).    2. Patient should be able to raise buttocks off be and hold for a count of five. May repeat once.   Pass- Patient maintains standing stability for at least 5 seconds, proceed to assessment level 4.    Assessment Level 4- Walk   1. Ask patient to march in place at bedside.    2. Then ask  patient to advance step and return each foot. Some medical conditions may render a patient from stepping backwards, use your best clinical judgement.   Pass- Patient demonstrates balance while shifting weight and ability to step, takes independent steps, does not use assistive device patient is MOBILITY LEVEL 4.      Mobility Level- 4

## 2024-10-08 NOTE — PROGRESS NOTES
[facility]                                 Pulmonary/Critical care  Consult /Progress Note :                                                                  CC :nausea ,vomiting   Patient is being seen at the request of Dr STANFORD  for a consultation for DKA     Subjective   Still nauseated but states t is better   Out of DKA  Using his pump  Start eating some   Off IVF    Lipase N       PHYSICAL EXAM:  Vitals:    10/08/24 1100   BP: (!) 149/87   Pulse: 88   Resp: 14   Temp:    SpO2: 90%     Gen: No distress.   Eyes: PERRL. No sclera icterus. No conjunctival injection.   ENT: No discharge. Pharynx clear.   Neck: Trachea midline. No obvious mass.    Resp: No accessory muscle use. No crackles. No wheezes. No rhonchi. No dullness on percussion.  CV: Regular rate. Regular rhythm. No murmur or rub. No edema. Peripheral pulses are 2+.  Capillary refill is less than 3 seconds.  GI: Non-tender. Non-distended. No hernia.   Skin: Warm and dry. No nodule on exposed extremities.   Lymph: No cervical LAD. No supraclavicular LAD.   M/S: No cyanosis. No joint deformity. No clubbing.   Neuro: Awake. Alert. Moves all four extremities.   Psych: Oriented x 3. No anxiety.     LABS:  CBC:   Recent Labs     10/06/24  0614 10/07/24  0454 10/08/24  0745   WBC 10.8 8.6 10.6   HGB 12.7* 12.2* 13.1*   HCT 36.2* 35.2* 36.9*   MCV 90.6 90.3 88.7    211 244     BMP:   Recent Labs     10/07/24  0730 10/07/24  1146 10/08/24  0424    144 141   K 3.1* 3.0* 3.1*    107 105   CO2 28 25 24   BUN 19 18 20   CREATININE 1.4* 1.4* 1.4*     LIVER PROFILE:   No results for input(s): \"AST\", \"ALT\", \"LIPASE\", \"AMYLASE\", \"BILIDIR\", \"BILITOT\", \"ALKPHOS\" in the last 72 hours.    Invalid input(s): \"ALB\"    PT/INR:   No results for input(s): \"PROTIME\", \"INR\" in the last 72 hours.        Microbiology:  sent    Imaging:  Chest imaging was reviewed by me and showed    Reviewed       CXR sowing new left side infiltrate   ASSESSMENT:  Acute hypoxic resp failure  Left side pneumonia     DKA   Hypophosphatemia  Hypokalemia  Rogers on CKD   Left side n[pneumonia  Possible         PLAN:  Has pump,insulin  Stop IVF   Continue Unasyn X 7 days ,may change PO next 1-2 days   Will get CXR   Creatinine 1.4   Nausea uncontrolled , GI following  ,work up for gastroparesis   Reglan ,Zofran    Keep in ICU  On 2-liters when sleep ,need sleep study as OP   Replace 3.1   CXR today   DVT px  Replaced Mag and K   Will follow and assess if need morefluid

## 2024-10-08 NOTE — PROGRESS NOTES
Pt family updated about him being moved to 2W. Mom and wife updated. Pt transferred by 2W RN in stable condition.

## 2024-10-08 NOTE — CARE COORDINATION
Met with Mary KRAUS to follow-up on discharge plan.    Patient being followed by GI - GI work up to be done outpatient.  Patient is still vomiting with intake. Keeping inpatient to hydrate until stops vomiting.    Currently on RA - sats 90%. Watching oxygen.     Downgraded to 2W.

## 2024-10-08 NOTE — PROGRESS NOTES
4 Eyes Skin Assessment     NAME:  Russell Doe  YOB: 1980  MEDICAL RECORD NUMBER:  [id number]    The patient is being assessed for  Transfer to New Unit    I agree that at least one RN has performed a thorough Head to Toe Skin Assessment on the patient. ALL assessment sites listed below have been assessed.      Areas assessed by both nurses: No open wounds. Skin intact.    Head, Face, Ears, Shoulders, Back, Chest, Arms, Elbows, Hands, Sacrum. Buttock, Coccyx, Ischium, Legs. Feet and Heels, and Under Medical Devices         Does the Patient have a Wound? No noted wound(s)       Seth Prevention initiated by RN: Yes  Wound Care Orders initiated by RN: No    Pressure Injury (Stage 3,4, Unstageable, DTI, NWPT, and Complex wounds) if present, place Wound referral order by RN under : No    New Ostomies, if present place, Ostomy referral order under : No     Nurse 1 eSignature: Electronically signed by Mary Resendiz RN on 10/8/24 at 6:02 PM EDT    **SHARE this note so that the co-signing nurse can place an eSignature**    Nurse 2 eSignature: Electronically signed by Aubrie Tabor RN on 10/8/24 at 6:02 PM EDT

## 2024-10-09 VITALS
BODY MASS INDEX: 27.05 KG/M2 | HEART RATE: 90 BPM | WEIGHT: 168.3 LBS | TEMPERATURE: 99 F | RESPIRATION RATE: 16 BRPM | HEIGHT: 66 IN | OXYGEN SATURATION: 93 % | SYSTOLIC BLOOD PRESSURE: 159 MMHG | DIASTOLIC BLOOD PRESSURE: 91 MMHG

## 2024-10-09 LAB
ANION GAP SERPL CALCULATED.3IONS-SCNC: 11 MMOL/L (ref 3–16)
BUN SERPL-MCNC: 23 MG/DL (ref 7–20)
CALCIUM SERPL-MCNC: 8.3 MG/DL (ref 8.3–10.6)
CHLORIDE SERPL-SCNC: 106 MMOL/L (ref 99–110)
CO2 SERPL-SCNC: 27 MMOL/L (ref 21–32)
CREAT SERPL-MCNC: 1.7 MG/DL (ref 0.9–1.3)
GFR SERPLBLD CREATININE-BSD FMLA CKD-EPI: 50 ML/MIN/{1.73_M2}
GLUCOSE SERPL-MCNC: 89 MG/DL (ref 70–99)
POTASSIUM SERPL-SCNC: 3.1 MMOL/L (ref 3.5–5.1)
SODIUM SERPL-SCNC: 144 MMOL/L (ref 136–145)

## 2024-10-09 PROCEDURE — 6360000002 HC RX W HCPCS: Performed by: INTERNAL MEDICINE

## 2024-10-09 PROCEDURE — 2580000003 HC RX 258: Performed by: INTERNAL MEDICINE

## 2024-10-09 PROCEDURE — 99232 SBSQ HOSP IP/OBS MODERATE 35: CPT | Performed by: INTERNAL MEDICINE

## 2024-10-09 PROCEDURE — 6370000000 HC RX 637 (ALT 250 FOR IP): Performed by: INTERNAL MEDICINE

## 2024-10-09 PROCEDURE — 99238 HOSP IP/OBS DSCHRG MGMT 30/<: CPT | Performed by: INTERNAL MEDICINE

## 2024-10-09 PROCEDURE — 36415 COLL VENOUS BLD VENIPUNCTURE: CPT

## 2024-10-09 PROCEDURE — 80048 BASIC METABOLIC PNL TOTAL CA: CPT

## 2024-10-09 RX ORDER — PANTOPRAZOLE SODIUM 40 MG/1
40 TABLET, DELAYED RELEASE ORAL
Qty: 30 TABLET | Refills: 0 | Status: SHIPPED | OUTPATIENT
Start: 2024-10-09

## 2024-10-09 RX ORDER — METOCLOPRAMIDE 10 MG/1
10 TABLET ORAL
Qty: 30 TABLET | Refills: 0 | Status: SHIPPED | OUTPATIENT
Start: 2024-10-09 | End: 2024-10-19

## 2024-10-09 RX ORDER — ONDANSETRON 4 MG/1
4 TABLET, FILM COATED ORAL EVERY 8 HOURS PRN
Qty: 20 TABLET | Refills: 0 | Status: SHIPPED | OUTPATIENT
Start: 2024-10-09

## 2024-10-09 RX ORDER — POTASSIUM CHLORIDE 1500 MG/1
40 TABLET, EXTENDED RELEASE ORAL ONCE
Status: COMPLETED | OUTPATIENT
Start: 2024-10-09 | End: 2024-10-09

## 2024-10-09 RX ORDER — BENZONATATE 100 MG/1
100 CAPSULE ORAL 3 TIMES DAILY PRN
Qty: 20 CAPSULE | Refills: 0 | Status: SHIPPED | OUTPATIENT
Start: 2024-10-09 | End: 2024-10-16

## 2024-10-09 RX ADMIN — POTASSIUM CHLORIDE 40 MEQ: 1500 TABLET, EXTENDED RELEASE ORAL at 12:36

## 2024-10-09 RX ADMIN — SODIUM CHLORIDE, PRESERVATIVE FREE 10 ML: 5 INJECTION INTRAVENOUS at 08:52

## 2024-10-09 RX ADMIN — PROMETHAZINE HYDROCHLORIDE 25 MG: 25 TABLET ORAL at 08:44

## 2024-10-09 RX ADMIN — LOSARTAN POTASSIUM 25 MG: 25 TABLET, FILM COATED ORAL at 08:44

## 2024-10-09 RX ADMIN — METOCLOPRAMIDE HYDROCHLORIDE 5 MG: 5 INJECTION INTRAMUSCULAR; INTRAVENOUS at 02:17

## 2024-10-09 RX ADMIN — LABETALOL HYDROCHLORIDE 20 MG: 5 INJECTION, SOLUTION INTRAVENOUS at 02:35

## 2024-10-09 RX ADMIN — AMPICILLIN SODIUM AND SULBACTAM SODIUM 3000 MG: 2; 1 INJECTION, POWDER, FOR SOLUTION INTRAMUSCULAR; INTRAVENOUS at 08:54

## 2024-10-09 RX ADMIN — AMPICILLIN SODIUM AND SULBACTAM SODIUM 3000 MG: 2; 1 INJECTION, POWDER, FOR SOLUTION INTRAMUSCULAR; INTRAVENOUS at 02:17

## 2024-10-09 RX ADMIN — PANTOPRAZOLE SODIUM 40 MG: 40 INJECTION, POWDER, FOR SOLUTION INTRAVENOUS at 08:47

## 2024-10-09 RX ADMIN — POLYETHYLENE GLYCOL (3350) 17 G: 17 POWDER, FOR SOLUTION ORAL at 08:44

## 2024-10-09 RX ADMIN — METOCLOPRAMIDE HYDROCHLORIDE 5 MG: 5 INJECTION INTRAMUSCULAR; INTRAVENOUS at 08:47

## 2024-10-09 RX ADMIN — ENOXAPARIN SODIUM 40 MG: 100 INJECTION SUBCUTANEOUS at 08:47

## 2024-10-09 RX ADMIN — AMLODIPINE BESYLATE 10 MG: 5 TABLET ORAL at 08:44

## 2024-10-09 NOTE — DISCHARGE INSTR - DIET

## 2024-10-09 NOTE — PROGRESS NOTES
Discussed discharge with patient including medications, follow-up, and care at home, IV removed. Family to transsport home.

## 2024-10-09 NOTE — PROGRESS NOTES
IM Progress Note    Admit Date:  10/3/2024  5    Interval history:  DKA , hx of TYpe 1 DM with CKD     Improved sugars and resolved anion gap , transitioned to home insulin pump  Nausea remains, developed cough and hypoxia overnight with new pna       Subjective:    Mr. Doe feels better and tolerating clears. Ordered solid diet for lunch and hoping to tolerate  Off o2   Had BM  Sugars stable on pump        Objective:   BP (!) 169/87   Pulse 88   Temp 98.8 °F (37.1 °C) (Oral)   Resp 16   Ht 1.676 m (5' 6\")   Wt 76.3 kg (168 lb 4.8 oz)   SpO2 92%   BMI 27.16 kg/m²     Intake/Output Summary (Last 24 hours) at 10/9/2024 0739  Last data filed at 10/9/2024 0255  Gross per 24 hour   Intake 2900.48 ml   Output --   Net 2900.48 ml       Physical Exam:      General:  middle aged male ill appearing-  Awake, alert and oriented. Appears to be not in any distress  Mucous Membranes:  Pink , anicteric  Neck: No JVD, no carotid bruit, no thyromegaly  Chest: improving shabbir air entry with resolving rhonchi on left base   Cardiovascular:  RRR S1S2 heard, no murmurs or gallops  Abdomen:  Soft, undistended, mid epigastric tenderness  , no organomegaly, BS present  Extremities: No edema or cyanosis. Distal pulses well felt  Neurological : grossly normal today     Medications:   Scheduled Medications:    amLODIPine  10 mg Oral Daily    losartan  25 mg Oral Daily    metoclopramide  5 mg IntraVENous Q6H    ampicillin-sulbactam  3,000 mg IntraVENous Q6H    pantoprazole (PROTONIX) 40 mg in sodium chloride (PF) 0.9 % 10 mL injection  40 mg IntraVENous Daily    sodium chloride flush  5-40 mL IntraVENous 2 times per day    enoxaparin  40 mg SubCUTAneous Daily    Insulin Pump - Bolus Dose   SubCUTAneous 4x Daily AC & HS    Insulin Pump - Basal Dose   SubCUTAneous Daily     I   sodium chloride Stopped (10/07/24 0946)    dextrose       promethazine, labetalol, prochlorperazine, guaiFENesin-dextromethorphan, Benzocaine-Menthol, albuterol,

## 2024-10-09 NOTE — DISCHARGE SUMMARY
Name:  Russell Doe  Room:  0221/0221-02  MRN:    [id number]    Discharge Summary      This discharge summary is in conjunction with a complete physical exam done on the day of discharge.    Discharging Physician: Dr. Grier       Admit: 10/3/2024  Discharge:  10/09/24     HPI taken from admission H&P:     44 y.o. male with history of type 1 diabetes, who presented to the ER with complaints of nausea vomiting with his glucometer reading significantly elevated glucose levels.  Patient was feeling very tired and not able to provide much history.  Apparently family member was at bedside, patient had changed his insulin pump yesterday.  She reported shortly afterwards the CGM monitoring device was noncommunicative but with the insulin pump as result the setting for positive to accurately measure patient's serum glucose level and correct as appropriate.  Workup in the ER revealed patient was in DKA     Diagnoses this Admission and Hospital Course     DKA with hx of DM 1  On insulin pump and f/w Endocrine at [facility] and not had DKA for a long time, etiology unclear   Sugars around 900 on arrival with severe aniongap acidosis and ARF    Started  patient on IV insulin drip per DKA protocol.    Admitted to  ICU for critical care management.   Aggressive IV fluid resuscitation given per protocol.    Monitored and replaced electrolytes per protocol.  Improved  acidosis, transitioned to home insulin pump and doing well      N/v - sec to above. Did not improve with zofran or reglan  Started compazine   AXr with no obstruction  Given recurrent emesis, gastroparesis suspected and consulted GI  scheduled zofran and reglan given by GI before each meal , planning for outpt EGD  Tolerating diet now , adv to solids .     CKD 3 b with ARF - sec to above  Given IVFgiven improved to baseline       Acute hypoxemic resp failure, suspect aspiration pneumonia with recurrent emesis  - likely CAP , left pna on imaging  - on unasyn D5--discharge

## 2024-10-09 NOTE — FLOWSHEET NOTE
10/09/24 0219   Vital Signs   Temp 98.8 °F (37.1 °C)   Temp Source Oral   Pulse 94   Heart Rate Source Monitor   Respirations 16   BP (!) 172/97   MAP (Calculated) 122   BP Location Left upper arm   Oxygen Therapy   SpO2 92 %   O2 Device None (Room air)     Prn labetalol given for elevated BP. Telemetry monitor on prior to admin. Sissy Macario RN

## 2024-10-09 NOTE — PROGRESS NOTES
[facility]                                 Pulmonary/Critical care  Consult /Progress Note :                                                                  CC :nausea ,vomiting   Patient is being seen at the request of Dr STANFORD  for a consultation for DKA     Subjective  Nausea much   Out of DKA  Using his pump  Start eating some   Off IVF    Lipase N       PHYSICAL EXAM:  Vitals:    10/09/24 1049   BP: (!) 158/89   Pulse: 92   Resp:    Temp:    SpO2:      Gen: No distress.   Eyes: PERRL. No sclera icterus. No conjunctival injection.   ENT: No discharge. Pharynx clear.   Neck: Trachea midline. No obvious mass.    Resp: Rhonchi bilateral   CV: Regular rate. Regular rhythm. No murmur or rub. No edema. Peripheral pulses are 2+.  Capillary refill is less than 3 seconds.  GI: Non-tender. Non-distended. No hernia.   Skin: Warm and dry. No nodule on exposed extremities.   Lymph: No cervical LAD. No supraclavicular LAD.   M/S: No cyanosis. No joint deformity. No clubbing.   Neuro: Awake. Alert. Moves all four extremities.   Psych: Oriented x 3. No anxiety.     LABS:  CBC:   Recent Labs     10/07/24  0454 10/08/24  0745   WBC 8.6 10.6   HGB 12.2* 13.1*   HCT 35.2* 36.9*   MCV 90.3 88.7    244     BMP:   Recent Labs     10/07/24  1146 10/08/24  0424 10/09/24  0748    141 144   K 3.0* 3.1* 3.1*    105 106   CO2 25 24 27   BUN 18 20 23*   CREATININE 1.4* 1.4* 1.7*         Microbiology:  sent    Imaging:  Chest imaging was reviewed by me and showed   Reviewed       CXR sowing new left side infiltrate   ASSESSMENT:  Acute hypoxic resp failure  Left side pneumonia     DKA   Hypophosphatemia  Hypokalemia  Rogers on CKD   Left side n[pneumonia  Possible         PLAN:    Continue Unasyn X 7 days ,may change PO next 1-2 days   Will get CXR as needed,last reviewed no effusion ,order on in am   Creatinine 1.4   Nausea uncontrolled ,  GI following  ,work up for gastroparesis   Reglan ,Zofran    On 2-liters when sleep ,need sleep study as OP   DVT px  Replaced Mag and K per IM   Discuss with wife

## 2024-10-09 NOTE — DISCHARGE INSTRUCTIONS
Your information:  Name: Russell Doe  : 1980    Your instructions:    Follow-up with PCP.   Follow-up with GI, if symptoms worsen. Need to schedule outpatient EGD, colonoscopy.   Take reglan and zofran 30 min before each meal   Can stop in few days if doing well  Complete antibiotics    What to do after you leave the hospital:    Recommended diet: diabetic diet    Recommended activity: activity as tolerated        The following personal items were collected during your admission and were returned to you:    Belongings  Dental Appliances: None  Vision - Corrective Lenses: Eyeglasses  Hearing Aid: None  Clothing: Shirt, Shorts, Undergarments  Jewelry: Watch, Ring (wife took home watch, has black silicone ring)  Body Piercings Removed: N/A  Electronic Devices: None  Weapons (Notify Protective Services/Security): None  Home Medications: None  Valuables Given To: Family (Comment)  Provide Name(s) of Who Valuable(s) Were Given To: wife took home watch  Patient approves for provider to speak to responsible person post operatively: Yes    Information obtained by:  By signing below, I understand that if any problems occur once I leave the hospital I am to contact PCP.  I understand and acknowledge receipt of the instructions indicated above.

## 2024-10-09 NOTE — PROGRESS NOTES
Pt resting. Resp e/e. Shift assessment completed and charted. No needs. Will monitor. Sissy Macario RN

## 2024-10-09 NOTE — PROGRESS NOTES
Pt sitting in bed this morning during shift assessment. He is alert and oriented and accepting of care. Pt reports continued nausea with no vomiting, PRN meds provided. IV abx provided per order. Pt ordered for regular diet, but reports mostly taking in only liquids. Will try solids at lunch time. PRN miralax provided, bowel movement noted, and pt encouraged to ambulate in the halls. BP stabilized after morning meds. IV intact. Call light within reach, family at bedside. No further concerns at this time.     Bedside Mobility Assessment Tool (BMAT):     Assessment Level 1- Sit and Shake    1. From a semi-reclined position, ask patient to sit up and rotate to a seated position at the side of the bed. Can use the bedrail.    2. Ask patient to reach out and grab your hand and shake making sure patient reaches across his/her midline.   Pass- Patient is able to come to a seated position, maintain core strength. Maintains seated balance while reaching across midline. Move on to Assessment Level 2.     Assessment Level 2- Stretch and Point   1. With patient in seated position at the side of the bed, have patient place both feet on the floor (or stool) with knees no higher than hips.    2. Ask patient to stretch one leg and straighten the knee, then bend the ankle/flex and point the toes. If appropriate, repeat with the other leg.   Pass- Patient is able to demonstrate appropriate quad strength on intended weight bearing limb(s). Move onto Assessment Level 3.     Assessment Level 3- Stand   1. Ask patient to elevate off the bed or chair (seated to standing) using an assistive device (cane, bedrail).    2. Patient should be able to raise buttocks off be and hold for a count of five. May repeat once.   Pass- Patient maintains standing stability for at least 5 seconds, proceed to assessment level 4.    Assessment Level 4- Walk   1. Ask patient to march in place at bedside.    2. Then ask patient to advance step and return each

## 2024-10-09 NOTE — CARE COORDINATION
Met with pt and spouse at beside. Pt to DC home today. Spouse to provide transport home. No further DC or DME needs identified.     IMM- na commercial    O2- 95% RA

## 2024-10-09 NOTE — PROGRESS NOTES
Progress Note    Patient Russell Doe  MRN: [id number]  YOB: 1980 Age: 44 y.o. Sex: male  Room: [address] [place] [address]       Admitting Physician: Harlan Wick MD   Date of Admission: 10/3/2024  9:58 PM   Primary Care Physician: Thomas Ríos MD     Subjective:  Russell Doe was seen and examined. We are following for nausea, hx gastroparesis .  -- did well overnight ate some food for lunch.     ROS:  Constitutional: Denies fever, no change in appetite  Respiratory: Denies cough or shortness of breath  Cardiovascular: Denies chest pain or edema    Objective:  Vital Signs:   Vitals:    10/09/24 1049   BP: (!) 158/89   Pulse: 92   Resp:    Temp:    SpO2:          Physical Exam:  Constitutional: Alert and oriented x 4. No acute distress.   HEENT: Sclera anicteric, mucosal membranes moist  Cardiovascular: Regular rate and rhythm.  No murmurs.  Respiratory: Respirations nonlabored, no crepitus  GI: Abdomen nondistended, soft, and nontender.  Normal active bowel sounds.  No masses palpable.   Rectal: Deferred  Musculoskeletal:  No pitting edema of the lower legs.  Neurological: Gross memory appears intact.      Intake/Output:    Intake/Output Summary (Last 24 hours) at 10/9/2024 1310  Last data filed at 10/9/2024 1238  Gross per 24 hour   Intake 3380.48 ml   Output --   Net 3380.48 ml        Current Medications:  Current Facility-Administered Medications   Medication Dose Route Frequency Provider Last Rate Last Admin    promethazine (PHENERGAN) tablet 25 mg  25 mg Oral Q6H PRN Kole No MD   25 mg at 10/09/24 0844    labetalol (NORMODYNE;TRANDATE) injection 20 mg  20 mg IntraVENous Q6H PRN Gerson Thompson MD   20 mg at 10/09/24 0235    amLODIPine (NORVASC) tablet 10 mg  10 mg Oral Daily Gerson Thompson MD   10 mg at 10/09/24 0844    losartan (COZAAR) tablet 25 mg  25 mg Oral Daily Gerson Thompson MD   25 mg at 10/09/24 0844    prochlorperazine (COMPAZINE)    BUN 20 19 18 20  --  23*   CREATININE 1.3 1.4* 1.4* 1.4*  --  1.7*   * 145 144 141  --  144   K 3.2* 3.1* 3.0* 3.1*  --  3.1*          Assessment:  Hospital Problems             Last Modified POA    * (Principal) DKA, type 1, not at goal (HCC) 10/4/2024 Yes    Diabetic ketoacidosis without coma associated with diabetes mellitus due to underlying condition (HCC) 10/4/2024 Yes    Stage 3b chronic kidney disease (HCC) 10/4/2024 Yes    Leukemoid reaction 10/4/2024 Yes    Acute hypoxemic respiratory failure 10/5/2024 Yes    Pneumonia due to infectious organism 10/7/2024 Yes    Nausea 10/7/2024 Yes       44-year-old history of type 1 diabetes CKD stage III is admitted with DKA GI was consulted for nausea vomiting.  History of gastric bezoar and likely gastroparesis.  Reglan was added.  Left-sided pneumonia on IV antibiotics.  No recent cross-sectional imaging this hospitalization.  Had a CT scan in March 2024 in the [facility] showed a mildly thickened urinary bladder solid organs unremarkable.  Gallbladder was contracted.  Gap is closed.  Procalcitonin elevated.  Hemoglobin is stable. Still with significant nausea with meal intake     Plan:  Continue PPI would continue this at discharge   Continue Reglan and zofran but will dose 30minutes before meal intake to see if tolerance improves.   Gastroparesis diet when able to tolerate more substantial food.  Outpatient follow up EGD planning he will need to be on clears from lunch the day before for this.  He will need a screening colonoscopy in the future  He hasn't had DKA in 20 years       Rodney Sheridan MD    [facility]    [phone]. Also available via [employer]    Please note that some or all of this record was generated using voice recognition software. If there are any questions about the content of this document, please contact the author as some errors in translation may have occurred.

## 2024-10-09 NOTE — PLAN OF CARE
Problem: Chronic Conditions and Co-morbidities  Goal: Patient's chronic conditions and co-morbidity symptoms are monitored and maintained or improved  10/9/2024 1039 by Daylin Bautista RN  Outcome: Progressing  Flowsheets (Taken 10/9/2024 1039)  Care Plan - Patient's Chronic Conditions and Co-Morbidity Symptoms are Monitored and Maintained or Improved:   Monitor and assess patient's chronic conditions and comorbid symptoms for stability, deterioration, or improvement   Collaborate with multidisciplinary team to address chronic and comorbid conditions and prevent exacerbation or deterioration  10/9/2024 0010 by Sissy Macario RN  Outcome: Progressing  Flowsheets (Taken 10/8/2024 2020)  Care Plan - Patient's Chronic Conditions and Co-Morbidity Symptoms are Monitored and Maintained or Improved: Monitor and assess patient's chronic conditions and comorbid symptoms for stability, deterioration, or improvement     Problem: Discharge Planning  Goal: Discharge to home or other facility with appropriate resources  10/9/2024 0010 by Sissy Macario RN  Outcome: Progressing  Flowsheets (Taken 10/8/2024 2020)  Discharge to home or other facility with appropriate resources: Identify barriers to discharge with patient and caregiver     Problem: Gastrointestinal - Adult  Goal: Minimal or absence of nausea and vomiting  10/9/2024 1039 by Daylin Bautista RN  Outcome: Progressing  Flowsheets (Taken 10/9/2024 1039)  Minimal or absence of nausea and vomiting:   Advance diet as tolerated, if ordered   Administer ordered antiemetic medications as needed  10/9/2024 0010 by Sissy Macario RN  Outcome: Progressing  Flowsheets (Taken 10/8/2024 2020)  Minimal or absence of nausea and vomiting: Administer IV fluids as ordered to ensure adequate hydration  Goal: Maintains or returns to baseline bowel function  10/9/2024 1039 by Daylin Bautista RN  Outcome: Adequate for Discharge  10/9/2024 0010 by Sissy Macario RN  Outcome: 
  Problem: Chronic Conditions and Co-morbidities  Goal: Patient's chronic conditions and co-morbidity symptoms are monitored and maintained or improved  10/9/2024 1421 by Daylin Bautista RN  Outcome: Completed  10/9/2024 1039 by Daylin Bautista RN  Outcome: Progressing  Flowsheets (Taken 10/9/2024 1039)  Care Plan - Patient's Chronic Conditions and Co-Morbidity Symptoms are Monitored and Maintained or Improved:   Monitor and assess patient's chronic conditions and comorbid symptoms for stability, deterioration, or improvement   Collaborate with multidisciplinary team to address chronic and comorbid conditions and prevent exacerbation or deterioration     Problem: Discharge Planning  Goal: Discharge to home or other facility with appropriate resources  Outcome: Completed     Problem: Gastrointestinal - Adult  Goal: Minimal or absence of nausea and vomiting  10/9/2024 1421 by Daylin Bautista RN  Outcome: Completed  10/9/2024 1039 by Daylin Bautista RN  Outcome: Progressing  Flowsheets (Taken 10/9/2024 1039)  Minimal or absence of nausea and vomiting:   Advance diet as tolerated, if ordered   Administer ordered antiemetic medications as needed  Goal: Maintains or returns to baseline bowel function  10/9/2024 1421 by Daylin Bautista RN  Outcome: Completed  10/9/2024 1039 by Daylin Bautista RN  Outcome: Adequate for Discharge  Goal: Maintains adequate nutritional intake  10/9/2024 1421 by Daylin Bautista RN  Outcome: Completed  10/9/2024 1039 by Daylin Bautista RN  Outcome: Progressing  Flowsheets (Taken 10/9/2024 1039)  Maintains adequate nutritional intake:   Monitor intake and output, weight and lab values   Monitor percentage of each meal consumed     Problem: Metabolic/Fluid and Electrolytes - Adult  Goal: Electrolytes maintained within normal limits  10/9/2024 1421 by Daylin Bautista RN  Outcome: Completed  10/9/2024 1039 by Daylin Bautista RN  Outcome: Progressing  Flowsheets (Taken 10/9/2024 
  Problem: Chronic Conditions and Co-morbidities  Goal: Patient's chronic conditions and co-morbidity symptoms are monitored and maintained or improved  Outcome: Progressing     Problem: Discharge Planning  Goal: Discharge to home or other facility with appropriate resources  Outcome: Progressing     Problem: Gastrointestinal - Adult  Goal: Minimal or absence of nausea and vomiting  Outcome: Progressing  Goal: Maintains or returns to baseline bowel function  Outcome: Progressing  Goal: Maintains adequate nutritional intake  Outcome: Progressing     Problem: Metabolic/Fluid and Electrolytes - Adult  Goal: Electrolytes maintained within normal limits  Outcome: Progressing  Goal: Hemodynamic stability and optimal renal function maintained  Outcome: Progressing  Goal: Glucose maintained within prescribed range  Outcome: Progressing     Problem: Skin/Tissue Integrity  Goal: Absence of new skin breakdown  Description: 1.  Monitor for areas of redness and/or skin breakdown  2.  Assess vascular access sites hourly  3.  Every 4-6 hours minimum:  Change oxygen saturation probe site  4.  Every 4-6 hours:  If on nasal continuous positive airway pressure, respiratory therapy assess nares and determine need for appliance change or resting period.  Outcome: Progressing     Problem: Pain  Goal: Verbalizes/displays adequate comfort level or baseline comfort level  Outcome: Progressing     
  Problem: Chronic Conditions and Co-morbidities  Goal: Patient's chronic conditions and co-morbidity symptoms are monitored and maintained or improved  Outcome: Progressing     Problem: Discharge Planning  Goal: Discharge to home or other facility with appropriate resources  Outcome: Progressing     Problem: Gastrointestinal - Adult  Goal: Minimal or absence of nausea and vomiting  Outcome: Progressing  Goal: Maintains or returns to baseline bowel function  Outcome: Progressing  Goal: Maintains adequate nutritional intake  Outcome: Progressing     Problem: Metabolic/Fluid and Electrolytes - Adult  Goal: Electrolytes maintained within normal limits  Outcome: Progressing  Goal: Hemodynamic stability and optimal renal function maintained  Outcome: Progressing  Goal: Glucose maintained within prescribed range  Outcome: Progressing     Problem: Skin/Tissue Integrity  Goal: Absence of new skin breakdown  Description: 1.  Monitor for areas of redness and/or skin breakdown  2.  Assess vascular access sites hourly  3.  Every 4-6 hours minimum:  Change oxygen saturation probe site  4.  Every 4-6 hours:  If on nasal continuous positive airway pressure, respiratory therapy assess nares and determine need for appliance change or resting period.  Outcome: Progressing     Problem: Pain  Goal: Verbalizes/displays adequate comfort level or baseline comfort level  Outcome: Progressing     
  Problem: Chronic Conditions and Co-morbidities  Goal: Patient's chronic conditions and co-morbidity symptoms are monitored and maintained or improved  Outcome: Progressing     Problem: Gastrointestinal - Adult  Goal: Minimal or absence of nausea and vomiting  Outcome: Progressing     Problem: Gastrointestinal - Adult  Goal: Maintains or returns to baseline bowel function  Outcome: Progressing     Problem: Gastrointestinal - Adult  Goal: Maintains adequate nutritional intake  Outcome: Progressing     Problem: Metabolic/Fluid and Electrolytes - Adult  Goal: Electrolytes maintained within normal limits  Outcome: Progressing     Problem: Metabolic/Fluid and Electrolytes - Adult  Goal: Hemodynamic stability and optimal renal function maintained  Outcome: Progressing     Problem: Metabolic/Fluid and Electrolytes - Adult  Goal: Glucose maintained within prescribed range  Outcome: Progressing     Problem: Skin/Tissue Integrity  Goal: Absence of new skin breakdown  Description: 1.  Monitor for areas of redness and/or skin breakdown  2.  Assess vascular access sites hourly  3.  Every 4-6 hours minimum:  Change oxygen saturation probe site  4.  Every 4-6 hours:  If on nasal continuous positive airway pressure, respiratory therapy assess nares and determine need for appliance change or resting period.  Outcome: Progressing     Problem: Pain  Goal: Verbalizes/displays adequate comfort level or baseline comfort level  Outcome: Progressing     
  Problem: Chronic Conditions and Co-morbidities  Goal: Patient's chronic conditions and co-morbidity symptoms are monitored and maintained or improved  Outcome: Progressing     Problem: Gastrointestinal - Adult  Goal: Minimal or absence of nausea and vomiting  Outcome: Progressing     Problem: Gastrointestinal - Adult  Goal: Maintains or returns to baseline bowel function  Outcome: Progressing     Problem: Metabolic/Fluid and Electrolytes - Adult  Goal: Electrolytes maintained within normal limits  Outcome: Progressing     Problem: Metabolic/Fluid and Electrolytes - Adult  Goal: Hemodynamic stability and optimal renal function maintained  Outcome: Progressing     Problem: Metabolic/Fluid and Electrolytes - Adult  Goal: Glucose maintained within prescribed range  Outcome: Progressing     Problem: Skin/Tissue Integrity  Goal: Absence of new skin breakdown  Description: 1.  Monitor for areas of redness and/or skin breakdown  2.  Assess vascular access sites hourly  3.  Every 4-6 hours minimum:  Change oxygen saturation probe site  4.  Every 4-6 hours:  If on nasal continuous positive airway pressure, respiratory therapy assess nares and determine need for appliance change or resting period.  Outcome: Progressing     
  Problem: Chronic Conditions and Co-morbidities  Goal: Patient's chronic conditions and co-morbidity symptoms are monitored and maintained or improved  Outcome: Progressing  Flowsheets (Taken 10/4/2024 0251)  Care Plan - Patient's Chronic Conditions and Co-Morbidity Symptoms are Monitored and Maintained or Improved: Monitor and assess patient's chronic conditions and comorbid symptoms for stability, deterioration, or improvement     Problem: Discharge Planning  Goal: Discharge to home or other facility with appropriate resources  Outcome: Progressing  Flowsheets  Taken 10/4/2024 0251  Discharge to home or other facility with appropriate resources: Identify barriers to discharge with patient and caregiver  Taken 10/4/2024 0250  Discharge to home or other facility with appropriate resources:   Identify barriers to discharge with patient and caregiver   Identify discharge learning needs (meds, wound care, etc)     Problem: Gastrointestinal - Adult  Goal: Minimal or absence of nausea and vomiting  Outcome: Progressing  Goal: Maintains or returns to baseline bowel function  Outcome: Progressing  Goal: Maintains adequate nutritional intake  Outcome: Progressing     Problem: Metabolic/Fluid and Electrolytes - Adult  Goal: Electrolytes maintained within normal limits  Outcome: Progressing  Goal: Hemodynamic stability and optimal renal function maintained  Outcome: Progressing  Goal: Glucose maintained within prescribed range  Outcome: Progressing     
Macario, Sissy, RN  Outcome: Progressing  Flowsheets (Taken 10/8/2024 2020)  Electrolytes maintained within normal limits: Monitor labs and assess patient for signs and symptoms of electrolyte imbalances  10/8/2024 1858 by Aubrie Tabor RN  Outcome: Progressing  Goal: Hemodynamic stability and optimal renal function maintained  10/9/2024 0010 by Sissy Macario RN  Outcome: Progressing  Flowsheets (Taken 10/8/2024 2020)  Hemodynamic stability and optimal renal function maintained: Monitor labs and assess for signs and symptoms of volume excess or deficit  10/8/2024 1858 by Aubrie Tabor RN  Outcome: Progressing  Goal: Glucose maintained within prescribed range  10/9/2024 0010 by Sissy Macario RN  Outcome: Progressing  Flowsheets (Taken 10/8/2024 2020)  Glucose maintained within prescribed range: Monitor blood glucose as ordered  10/8/2024 1858 by Aubrie Tabor RN  Outcome: Progressing     Problem: Skin/Tissue Integrity  Goal: Absence of new skin breakdown  Description: 1.  Monitor for areas of redness and/or skin breakdown  2.  Assess vascular access sites hourly  3.  Every 4-6 hours minimum:  Change oxygen saturation probe site  4.  Every 4-6 hours:  If on nasal continuous positive airway pressure, respiratory therapy assess nares and determine need for appliance change or resting period.  10/9/2024 0010 by Sissy Macario RN  Outcome: Progressing  10/8/2024 1858 by Aubrie Tabor RN  Outcome: Progressing     Problem: Pain  Goal: Verbalizes/displays adequate comfort level or baseline comfort level  10/9/2024 0010 by Sissy Macario RN  Outcome: Progressing  Flowsheets (Taken 10/8/2024 2023)  Verbalizes/displays adequate comfort level or baseline comfort level: Encourage patient to monitor pain and request assistance  10/8/2024 1858 by Aubrie Tabor RN  Outcome: Progressing     Problem: Safety - Adult  Goal: Free from fall injury  Outcome: Progressing     Problem: Respiratory - Adult  Goal: Achieves

## 2024-10-11 NOTE — PROGRESS NOTES
Physician Progress Note      PATIENT:               BRENDA HOLLEY  CSN #:                  [id number]  :                       1980  ADMIT DATE:       10/3/2024 9:58 PM  DISCH DATE:        10/9/2024 2:45 PM  RESPONDING  PROVIDER #:        Gerson Thompson MD          QUERY TEXT:    Pt admitted with DKA.  Per pulmonary consult PN on 10/7, Cautious for   worsening pneumonia/sepsis. WBC-16, P-109, pct-28.55. If possible, please   document in progress notes and discharge summary:    The medical record reflects the following:  Risk Factors: DKA, pneumonia, ckd  Clinical Indicators: admitted with DKA, Acute hypoxemic resp failure, suspect   aspiration pneumonia with recurrent emesis  - likely CAP , left pna on imaging,  WBC-16, P-109, pct-28.55  Treatment: lactic, pct, Unasyn, blood cultures, chest xr, oxygen, supportive   care, IVF  Options provided:  -- Severe Sepsis due to pneumonia present on admission  -- Severe Sepsis due to pneumonia confirmed not present on admission  -- Sepsis ruled out  -- Other - I will add my own diagnosis  -- Disagree - Not applicable / Not valid  -- Disagree - Clinically unable to determine / Unknown  -- Refer to Clinical Documentation Reviewer    PROVIDER RESPONSE TEXT:    The diagnosis of sepsis was ruled out.    Query created by: Sahnti Stephenson on 10/11/2024 6:14 AM      Electronically signed by:  Gerson Thompson MD 10/11/2024 7:49 AM

## 2025-02-01 ENCOUNTER — OFFICE VISIT (OUTPATIENT)
Age: 45
End: 2025-02-01

## 2025-02-01 VITALS
HEART RATE: 76 BPM | OXYGEN SATURATION: 97 % | BODY MASS INDEX: 27 KG/M2 | DIASTOLIC BLOOD PRESSURE: 60 MMHG | TEMPERATURE: 97.6 F | SYSTOLIC BLOOD PRESSURE: 100 MMHG | HEIGHT: 66 IN | WEIGHT: 168 LBS

## 2025-02-01 DIAGNOSIS — K52.9 GASTROENTERITIS: Primary | ICD-10-CM

## 2025-02-01 DIAGNOSIS — R11.2 NAUSEA AND VOMITING, UNSPECIFIED VOMITING TYPE: ICD-10-CM

## 2025-02-01 DIAGNOSIS — R05.1 ACUTE COUGH: ICD-10-CM

## 2025-02-01 RX ORDER — ONDANSETRON 8 MG/1
8 TABLET, FILM COATED ORAL 3 TIMES DAILY PRN
Qty: 15 TABLET | Refills: 0 | Status: SHIPPED | OUTPATIENT
Start: 2025-02-01

## 2025-02-01 ASSESSMENT — ENCOUNTER SYMPTOMS
EYES NEGATIVE: 1
VOMITING: 1
ALLERGIC/IMMUNOLOGIC NEGATIVE: 1
ABDOMINAL PAIN: 1
COUGH: 1
DIARRHEA: 0

## 2025-02-01 NOTE — PATIENT INSTRUCTIONS
Zofran prescribed for relief of nausea.  Increase intake of sport drinks or other electrolyte rich drinks, such as Gatorade, Pedialyte, or 50:50 apple juice diluted with water to help replenish lost fluids and electrolytes.  King/BRAT (Bananas, Rice, Applesauce, Toast) diet during this time with a slow progression back to normal diet.  If diarrhea develops, do not use Imodium or Pepto-bismol to stop the diarrhea as it may prolong the illness - continue with increased fluids and bland diet.  Hot compresses/heating pads over the stomach to help with cramping.  If pain becomes severe, or localizes to the lower right side of the abdomen, if dark-tary stools develop, if unresolving vomiting, if blood in noted in stool or vomit, or if you become concerned for dehydration follow up with the emergency room.  Follow up with your PCP within 5 days or, if unable, you can return to the clinic if symptoms persist beyond 5 days or if symptoms worsen.    New Prescriptions    ONDANSETRON (ZOFRAN) 8 MG TABLET    Take 1 tablet by mouth 3 times daily as needed for Nausea or Vomiting